# Patient Record
Sex: FEMALE | Race: WHITE | NOT HISPANIC OR LATINO | Employment: UNEMPLOYED | URBAN - NONMETROPOLITAN AREA
[De-identification: names, ages, dates, MRNs, and addresses within clinical notes are randomized per-mention and may not be internally consistent; named-entity substitution may affect disease eponyms.]

---
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
## 2018-07-01 ENCOUNTER — APPOINTMENT (OUTPATIENT)
Dept: GENERAL RADIOLOGY | Facility: HOSPITAL | Age: 18
End: 2018-07-01
Attending: INTERNAL MEDICINE
Payer: COMMERCIAL

## 2018-07-01 ENCOUNTER — HOSPITAL ENCOUNTER (EMERGENCY)
Facility: HOSPITAL | Age: 18
Discharge: HOME | End: 2018-07-01
Attending: INTERNAL MEDICINE | Admitting: INTERNAL MEDICINE
Payer: COMMERCIAL

## 2018-07-01 VITALS
OXYGEN SATURATION: 97 % | RESPIRATION RATE: 16 BRPM | TEMPERATURE: 97.7 F | DIASTOLIC BLOOD PRESSURE: 76 MMHG | SYSTOLIC BLOOD PRESSURE: 114 MMHG

## 2018-07-01 DIAGNOSIS — J06.9 ACUTE URI: ICD-10-CM

## 2018-07-01 DIAGNOSIS — K59.00 CONSTIPATION, UNSPECIFIED CONSTIPATION TYPE: ICD-10-CM

## 2018-07-01 LAB
ALBUMIN UR-MCNC: 30 MG/DL
APPEARANCE UR: ABNORMAL
BACTERIA #/AREA URNS HPF: ABNORMAL /HPF
BILIRUB UR QL STRIP: ABNORMAL
COLOR UR AUTO: YELLOW
GLUCOSE UR STRIP-MCNC: NEGATIVE MG/DL
HCG UR QL: NEGATIVE
HGB UR QL STRIP: ABNORMAL
KETONES UR STRIP-MCNC: 10 MG/DL
LEUKOCYTE ESTERASE UR QL STRIP: ABNORMAL
MUCOUS THREADS #/AREA URNS LPF: PRESENT /LPF
NITRATE UR QL: NEGATIVE
PH UR STRIP: 5.5 PH (ref 4.7–8)
RBC #/AREA URNS AUTO: 65 /HPF (ref 0–2)
SOURCE: ABNORMAL
SP GR UR STRIP: 1.02 (ref 1–1.03)
SQUAMOUS #/AREA URNS AUTO: 2 /HPF (ref 0–1)
UROBILINOGEN UR STRIP-MCNC: 4 MG/DL (ref 0–2)
WBC #/AREA URNS AUTO: 5 /HPF (ref 0–5)

## 2018-07-01 PROCEDURE — 99284 EMERGENCY DEPT VISIT MOD MDM: CPT | Mod: 25

## 2018-07-01 PROCEDURE — 25000125 ZZHC RX 250: Performed by: INTERNAL MEDICINE

## 2018-07-01 PROCEDURE — 81001 URINALYSIS AUTO W/SCOPE: CPT | Performed by: INTERNAL MEDICINE

## 2018-07-01 PROCEDURE — 71046 X-RAY EXAM CHEST 2 VIEWS: CPT | Mod: TC

## 2018-07-01 PROCEDURE — 25000132 ZZH RX MED GY IP 250 OP 250 PS 637: Performed by: INTERNAL MEDICINE

## 2018-07-01 PROCEDURE — 81025 URINE PREGNANCY TEST: CPT | Performed by: INTERNAL MEDICINE

## 2018-07-01 PROCEDURE — 99284 EMERGENCY DEPT VISIT MOD MDM: CPT | Performed by: INTERNAL MEDICINE

## 2018-07-01 RX ORDER — PREDNISONE 20 MG/1
20 TABLET ORAL ONCE
Status: COMPLETED | OUTPATIENT
Start: 2018-07-01 | End: 2018-07-01

## 2018-07-01 RX ORDER — LACTULOSE 10 G/15ML
20 SOLUTION ORAL 2 TIMES DAILY
Qty: 473 ML | Refills: 0 | Status: SHIPPED | OUTPATIENT
Start: 2018-07-01 | End: 2018-08-19

## 2018-07-01 RX ORDER — PREDNISONE 20 MG/1
20 TABLET ORAL DAILY
Qty: 3 TABLET | Refills: 0 | Status: SHIPPED | OUTPATIENT
Start: 2018-07-01 | End: 2018-08-19

## 2018-07-01 RX ORDER — ALUMINA, MAGNESIA, AND SIMETHICONE 2400; 2400; 240 MG/30ML; MG/30ML; MG/30ML
30 SUSPENSION ORAL ONCE
Status: COMPLETED | OUTPATIENT
Start: 2018-07-01 | End: 2018-07-01

## 2018-07-01 RX ORDER — BENZONATATE 100 MG/1
100 CAPSULE ORAL ONCE
Status: COMPLETED | OUTPATIENT
Start: 2018-07-01 | End: 2018-07-01

## 2018-07-01 RX ORDER — GUAIFENESIN 600 MG/1
600 TABLET, EXTENDED RELEASE ORAL 2 TIMES DAILY
COMMUNITY
End: 2018-08-19

## 2018-07-01 RX ORDER — MAGNESIUM CARB/ALUMINUM HYDROX 105-160MG
148 TABLET,CHEWABLE ORAL ONCE
Status: COMPLETED | OUTPATIENT
Start: 2018-07-01 | End: 2018-07-01

## 2018-07-01 RX ADMIN — MAGESIUM CITRATE 148 ML: 1.75 LIQUID ORAL at 04:31

## 2018-07-01 RX ADMIN — PREDNISONE 20 MG: 20 TABLET ORAL at 04:31

## 2018-07-01 RX ADMIN — BENZONATATE 100 MG: 100 CAPSULE, LIQUID FILLED ORAL at 02:32

## 2018-07-01 RX ADMIN — ALUMINUM HYDROXIDE, MAGNESIUM HYDROXIDE, AND DIMETHICONE 30 ML: 400; 400; 40 SUSPENSION ORAL at 02:32

## 2018-07-01 ASSESSMENT — ENCOUNTER SYMPTOMS
DIZZINESS: 0
FEVER: 0
BLOOD IN STOOL: 0
WHEEZING: 0
ABDOMINAL PAIN: 0
CHEST TIGHTNESS: 0
CHILLS: 0
NAUSEA: 0
ARTHRALGIAS: 0
COUGH: 1
VOMITING: 0
NUMBNESS: 0
DYSURIA: 0
BACK PAIN: 0
PALPITATIONS: 0
HEMATURIA: 0
WOUND: 0
MYALGIAS: 0
CONFUSION: 0
VOICE CHANGE: 0
RHINORRHEA: 1
DIAPHORESIS: 0
NECK STIFFNESS: 0
LIGHT-HEADEDNESS: 0
ANAL BLEEDING: 0
FLANK PAIN: 0
ABDOMINAL DISTENTION: 0
COLOR CHANGE: 0
HEADACHES: 0
SHORTNESS OF BREATH: 0
NECK PAIN: 0

## 2018-07-01 NOTE — ED NOTES
D/c instructiosn reviewed with patient  2 rx's given and will fill at pharmacy of choice.  encouraged to return with new or worsening symptoms   No questions or concerns.

## 2018-07-01 NOTE — ED PROVIDER NOTES
History     Chief Complaint   Patient presents with     Cough     Rib Pain     from coughing     Patient is a 17 year old female presenting with cough. The history is provided by the patient.   Cough   Cough characteristics:  Dry  Severity:  Moderate  Onset quality:  Gradual  Timing:  Constant  Chronicity:  New  Associated symptoms: rhinorrhea    Associated symptoms: no chest pain, no chills, no diaphoresis, no fever, no headaches, no myalgias, no rash, no shortness of breath and no wheezing        Problem List:    Patient Active Problem List    Diagnosis Date Noted     Contraceptive management 12/03/2015     Priority: Medium     Overview:   implanon 10/15       Ruptured ovarian cyst 02/10/2014     Priority: Medium     Ruptured ovarian cyst with hemoperitoneum and pelvic pain          Past Medical History:    No past medical history on file.    Past Surgical History:    Past Surgical History:   Procedure Laterality Date     HC REMOVE TONSILS/ADENOIDS,<11 Y/O       SURGICAL PATHOLOGY EXAM      removal of papiloma       Family History:    Family History   Problem Relation Age of Onset     Unknown/Adopted No family hx of      Asthma No family hx of      C.A.D. No family hx of      Diabetes No family hx of      Cerebrovascular Disease No family hx of      Hypertension No family hx of      Hypertension No family hx of      Cerebrovascular Disease No family hx of        Social History:  Marital Status:  Single [1]  Social History   Substance Use Topics     Smoking status: Never Smoker     Smokeless tobacco: Never Used     Alcohol use No      Comment: last use month ago        Medications:      guaiFENesin (MUCINEX) 600 MG 12 hr tablet   lactulose (CHRONULAC) 10 GM/15ML solution   predniSONE (DELTASONE) 20 MG tablet         Review of Systems   Constitutional: Negative for chills, diaphoresis and fever.   HENT: Positive for congestion and rhinorrhea. Negative for voice change.    Eyes: Negative for visual disturbance.    Respiratory: Positive for cough. Negative for chest tightness, shortness of breath and wheezing.    Cardiovascular: Negative for chest pain, palpitations and leg swelling.   Gastrointestinal: Negative for abdominal distention, abdominal pain, anal bleeding, blood in stool, nausea and vomiting.   Genitourinary: Negative for decreased urine volume, dysuria, flank pain and hematuria.   Musculoskeletal: Negative for arthralgias, back pain, gait problem, myalgias, neck pain and neck stiffness.   Skin: Negative for color change, pallor, rash and wound.   Neurological: Negative for dizziness, syncope, light-headedness, numbness and headaches.   Psychiatric/Behavioral: Negative for confusion and suicidal ideas.       Physical Exam   BP: 114/76  Heart Rate: 99  Temp: 97.7  F (36.5  C)  Resp: 16  SpO2: 97 %      Physical Exam   Constitutional: She is oriented to person, place, and time. She appears well-developed and well-nourished.   HENT:   Head: Normocephalic and atraumatic.   Mouth/Throat: No oropharyngeal exudate.   Eyes: Conjunctivae are normal. Pupils are equal, round, and reactive to light.   Neck: Normal range of motion. Neck supple. No JVD present. No tracheal deviation present. No thyromegaly present.   Cardiovascular: Normal rate, regular rhythm, normal heart sounds and intact distal pulses.  Exam reveals no gallop and no friction rub.    No murmur heard.  Pulmonary/Chest: Effort normal and breath sounds normal. No stridor. No respiratory distress. She has no wheezes. She has no rales. She exhibits no tenderness.   Abdominal: Soft. Bowel sounds are normal. She exhibits no distension and no mass. There is no tenderness. There is no rebound and no guarding.   Musculoskeletal: Normal range of motion. She exhibits no edema or tenderness.   Lymphadenopathy:     She has no cervical adenopathy.   Neurological: She is alert and oriented to person, place, and time.   Skin: Skin is warm and dry. No rash noted. No  erythema. No pallor.   Psychiatric: Her behavior is normal.   Nursing note and vitals reviewed.      ED Course     ED Course     Procedures                   Results for orders placed or performed during the hospital encounter of 07/01/18 (from the past 24 hour(s))   UA with Microscopic reflex to Culture   Result Value Ref Range    Color Urine Yellow     Appearance Urine Slightly Cloudy     Glucose Urine Negative NEG^Negative mg/dL    Bilirubin Urine Small (A) NEG^Negative    Ketones Urine 10 (A) NEG^Negative mg/dL    Specific Gravity Urine 1.025 1.003 - 1.035    Blood Urine Moderate (A) NEG^Negative    pH Urine 5.5 4.7 - 8.0 pH    Protein Albumin Urine 30 (A) NEG^Negative mg/dL    Urobilinogen mg/dL 4.0 (H) 0.0 - 2.0 mg/dL    Nitrite Urine Negative NEG^Negative    Leukocyte Esterase Urine Small (A) NEG^Negative    Source Midstream Urine     WBC Urine 5 0 - 5 /HPF    RBC Urine 65 (H) 0 - 2 /HPF    Bacteria Urine None (A) NEG^Negative /HPF    Squamous Epithelial /HPF Urine 2 (H) 0 - 1 /HPF    Mucous Urine Present (A) NEG^Negative /LPF   HCG qualitative urine   Result Value Ref Range    HCG Qual Urine Negative NEG^Negative       Medications   alum & mag hydroxide-simethicone (MYLANTA ES/MAALOX  ES) suspension 30 mL (30 mLs Oral Given 7/1/18 0232)   benzonatate (TESSALON) capsule 100 mg (100 mg Oral Given 7/1/18 0232)   predniSONE (DELTASONE) tablet 20 mg (20 mg Oral Given 7/1/18 0431)   magnesium citrate solution 148 mL (148 mLs Oral Given 7/1/18 0431)       Assessments & Plan (with Medical Decision Making)   Dry cough, URI symptoms, Left lower chest/flank pain with coughing  CXR: negative  Pt obseved in ER, symptoms improved  Symptomatic treatment for URIs  UA: hemturia due to mestral period  Dc home, fu with PCP  I have reviewed the nursing notes.    I have reviewed the findings, diagnosis, plan and need for follow up with the patient.      New Prescriptions    LACTULOSE (CHRONULAC) 10 GM/15ML SOLUTION    Take 30  mLs (20 g) by mouth 2 times daily    PREDNISONE (DELTASONE) 20 MG TABLET    Take 1 tablet (20 mg) by mouth daily       Final diagnoses:   Acute URI   Constipation, unspecified constipation type       7/1/2018   [facility]     Fabricio Miner MD  07/01/18 5499

## 2018-07-01 NOTE — ED AVS SNAPSHOT
[facility]    [address] [address]    Phone:  [phone]                                       Shirley Daniels   MRN: [id number]    Department:  [facility]   Date of Visit:  7/1/2018           Patient Information     Date Of Birth          2000        Your diagnoses for this visit were:     Acute URI     Constipation, unspecified constipation type        You were seen by Fabricio Miner MD.      Follow-up Information     Schedule an appointment as soon as possible for a visit with Steafn Nur MD.    Specialty:  Family Practice    Contact information:    [facility]  [address] [place] [id number]  [phone]          Discharge Instructions         Viral Upper Respiratory Illness (Adult)  You have a viral upper respiratory illness (URI), which is another term for the common cold. This illness is contagious during the first few days. It is spread through the air by coughing and sneezing. It may also be spread by direct contact (touching the sick person and then touching your own eyes, nose, or mouth). Frequent handwashing will decrease risk of spread. Most viral illnesses go away within 7 to 10 days with rest and simple home remedies. Sometimes the illness may last for several weeks. Antibiotics will not kill a virus, and they are generally not prescribed for this condition.    Home care    If symptoms are severe, rest at home for the first 2 to 3 days. When you resume activity, don't let yourself get too tired.    Avoid being exposed to cigarette smoke (yours or others ).    You may use acetaminophen or ibuprofen to control pain and fever, unless another medicine was prescribed. If you have chronic liver or kidney disease, have ever had a stomach ulcer or gastrointestinal bleeding, or are taking blood-thinning medicines, talk with your healthcare provider before using these medicines. Aspirin should never be given to anyone under 18 years of  age who is ill with a viral infection or fever. It may cause severe liver or brain damage.    Your appetite may be poor, so a light diet is fine. Avoid dehydration by drinking 6 to 8 glasses of fluids per day (water, soft drinks, juices, tea, or soup). Extra fluids will help loosen secretions in the nose and lungs.    Over-the-counter cold medicines will not shorten the length of time you re sick, but they may be helpful for the following symptoms: cough, sore throat, and nasal and sinus congestion. (Note: Do not use decongestants if you have high blood pressure.)  Follow-up care  Follow up with your healthcare provider, or as advised.  When to seek medical advice  Call your healthcare provider right away if any of these occur:    Cough with lots of colored sputum (mucus)    Severe headache; face, neck, or ear pain    Difficulty swallowing due to throat pain    Fever of 100.4 F (38 C) or higher, or as directed by your healthcare provider  Call 911  Call 911 if any of these occur:    Chest pain, shortness of breath, wheezing, or difficulty breathing    Coughing up blood    Inability to swallow due to throat pain  Date Last Reviewed: 9/13/2015 2000-2017 [employer]. [address] [place] [address]. All rights reserved. This information is not intended as a substitute for professional medical care. Always follow your healthcare professional's instructions.             Review of your medicines      START taking        Dose / Directions Last dose taken    lactulose 10 GM/15ML solution   Commonly known as:  CHRONULAC   Dose:  20 g   Quantity:  473 mL        Take 30 mLs (20 g) by mouth 2 times daily   Refills:  0        predniSONE 20 MG tablet   Commonly known as:  DELTASONE   Dose:  20 mg   Quantity:  3 tablet        Take 1 tablet (20 mg) by mouth daily   Refills:  0          Our records show that you are taking the medicines listed below. If these are incorrect, please call your family doctor  "or clinic.        Dose / Directions Last dose taken    guaiFENesin 600 MG 12 hr tablet   Commonly known as:  MUCINEX   Dose:  600 mg        Take 600 mg by mouth 2 times daily   Refills:  0                Prescriptions were sent or printed at these locations (2 Prescriptions)                   Other Prescriptions                Printed at Department/Unit printer (2 of 2)         lactulose (CHRONULAC) 10 GM/15ML solution               predniSONE (DELTASONE) 20 MG tablet                Procedures and tests performed during your visit     HCG qualitative urine    UA with Microscopic reflex to Culture    XR Chest 2 Views      Orders Needing Specimen Collection     None      Pending Results     Date and Time Order Name Status Description    2018 0150 XR Chest 2 Views In process             Pending Culture Results     No orders found from 2018 to 2018.            Thank you for choosing [facility]       Thank you for choosing [facility] for your care. Our goal is always to provide you with excellent care. Hearing back from our patients is one way we can continue to improve our services. Please take a few minutes to complete the written survey that you may receive in the mail after you visit with us. Thank you!        [employer] Information     [employer] lets you send messages to your doctor, view your test results, renew your prescriptions, schedule appointments and more. To sign up, go to [email or web address] . Click on \"Log in\" on the left side of the screen, which will take you to the Welcome page. Then click on \"Sign up Now\" on the right side of the page.     You will be asked to enter the access code listed below, as well as some personal information. Please follow the directions to create your username and password.     Your access code is: [email or web address]  Expires: 2018  4:27 AM     Your access code will  in 90 days. If you need help or a new code, please call your [facility] clinic or [phone].      "   Care EveryWhere ID     This is your Care EveryWhere ID. This could be used by other organizations to access your [facility] medical records  [id number]        Equal Access to Services     SANJUANA CLEMENTS : Saira Medel, miladys burns, elle tanner. So [facility] [phone].    ATENCIÓN: Si habla español, tiene a bales disposición servicios gratuitos de asistencia lingüística. Llame al [phone].    We comply with applicable federal civil rights laws and [place] laws. We do not discriminate on the basis of race, color, national origin, age, disability, sex, sexual orientation, or gender identity.            After Visit Summary       This is your record. Keep this with you and show to your community pharmacist(s) and doctor(s) at your next visit.

## 2018-07-01 NOTE — DISCHARGE INSTRUCTIONS
Viral Upper Respiratory Illness (Adult)  You have a viral upper respiratory illness (URI), which is another term for the common cold. This illness is contagious during the first few days. It is spread through the air by coughing and sneezing. It may also be spread by direct contact (touching the sick person and then touching your own eyes, nose, or mouth). Frequent handwashing will decrease risk of spread. Most viral illnesses go away within 7 to 10 days with rest and simple home remedies. Sometimes the illness may last for several weeks. Antibiotics will not kill a virus, and they are generally not prescribed for this condition.    Home care    If symptoms are severe, rest at home for the first 2 to 3 days. When you resume activity, don't let yourself get too tired.    Avoid being exposed to cigarette smoke (yours or others ).    You may use acetaminophen or ibuprofen to control pain and fever, unless another medicine was prescribed. If you have chronic liver or kidney disease, have ever had a stomach ulcer or gastrointestinal bleeding, or are taking blood-thinning medicines, talk with your healthcare provider before using these medicines. Aspirin should never be given to anyone under 18 years of age who is ill with a viral infection or fever. It may cause severe liver or brain damage.    Your appetite may be poor, so a light diet is fine. Avoid dehydration by drinking 6 to 8 glasses of fluids per day (water, soft drinks, juices, tea, or soup). Extra fluids will help loosen secretions in the nose and lungs.    Over-the-counter cold medicines will not shorten the length of time you re sick, but they may be helpful for the following symptoms: cough, sore throat, and nasal and sinus congestion. (Note: Do not use decongestants if you have high blood pressure.)  Follow-up care  Follow up with your healthcare provider, or as advised.  When to seek medical advice  Call your healthcare provider right away if any of these  occur:    Cough with lots of colored sputum (mucus)    Severe headache; face, neck, or ear pain    Difficulty swallowing due to throat pain    Fever of 100.4 F (38 C) or higher, or as directed by your healthcare provider  Call 911  Call 911 if any of these occur:    Chest pain, shortness of breath, wheezing, or difficulty breathing    Coughing up blood    Inability to swallow due to throat pain  Date Last Reviewed: 9/13/2015 2000-2017 [employer]. [address] [place] [address]. All rights reserved. This information is not intended as a substitute for professional medical care. Always follow your healthcare professional's instructions.

## 2018-07-01 NOTE — ED AVS SNAPSHOT
[facility]    [address] [address]    Phone:  [phone]                                       Shirley Daniels   MRN: [id number]    Department:  [facility]   Date of Visit:  7/1/2018           After Visit Summary Signature Page     I have received my discharge instructions, and my questions have been answered. I have discussed any challenges I see with this plan with the nurse or doctor.    ..........................................................................................................................................  Patient/Patient Representative Signature      ..........................................................................................................................................  Patient Representative Print Name and Relationship to Patient    ..................................................               ................................................  Date                                            Time    ..........................................................................................................................................  Reviewed by Signature/Title    ...................................................              ..............................................  Date                                                            Time

## 2018-08-19 ENCOUNTER — HOSPITAL ENCOUNTER (EMERGENCY)
Facility: HOSPITAL | Age: 18
Discharge: HOME | End: 2018-08-19
Attending: PHYSICIAN ASSISTANT | Admitting: PHYSICIAN ASSISTANT
Payer: COMMERCIAL

## 2018-08-19 VITALS
RESPIRATION RATE: 16 BRPM | SYSTOLIC BLOOD PRESSURE: 107 MMHG | BODY MASS INDEX: 21.26 KG/M2 | DIASTOLIC BLOOD PRESSURE: 62 MMHG | WEIGHT: 120 LBS | TEMPERATURE: 100.2 F | OXYGEN SATURATION: 98 % | HEART RATE: 96 BPM | HEIGHT: 63 IN

## 2018-08-19 DIAGNOSIS — R19.7 DIARRHEA, UNSPECIFIED TYPE: ICD-10-CM

## 2018-08-19 DIAGNOSIS — N10 ACUTE PYELONEPHRITIS: ICD-10-CM

## 2018-08-19 LAB
ALBUMIN SERPL-MCNC: 3.2 G/DL (ref 3.4–5)
ALBUMIN UR-MCNC: 100 MG/DL
ALP SERPL-CCNC: 99 U/L (ref 40–150)
ALT SERPL W P-5'-P-CCNC: 23 U/L (ref 0–50)
ANION GAP SERPL CALCULATED.3IONS-SCNC: 7 MMOL/L (ref 3–14)
APPEARANCE UR: ABNORMAL
AST SERPL W P-5'-P-CCNC: 10 U/L (ref 0–35)
BACTERIA #/AREA URNS HPF: ABNORMAL /HPF
BASOPHILS # BLD AUTO: 0 10E9/L (ref 0–0.2)
BASOPHILS NFR BLD AUTO: 0.2 %
BILIRUB SERPL-MCNC: 0.8 MG/DL (ref 0.2–1.3)
BILIRUB UR QL STRIP: NEGATIVE
BUN SERPL-MCNC: 7 MG/DL (ref 7–30)
CALCIUM SERPL-MCNC: 8.5 MG/DL (ref 8.5–10.1)
CHLORIDE SERPL-SCNC: 99 MMOL/L (ref 96–110)
CO2 SERPL-SCNC: 27 MMOL/L (ref 20–32)
COLOR UR AUTO: YELLOW
CREAT SERPL-MCNC: 0.89 MG/DL (ref 0.5–1)
DIFFERENTIAL METHOD BLD: ABNORMAL
EOSINOPHIL # BLD AUTO: 0 10E9/L (ref 0–0.7)
EOSINOPHIL NFR BLD AUTO: 0.1 %
ERYTHROCYTE [DISTWIDTH] IN BLOOD BY AUTOMATED COUNT: 13.1 % (ref 10–15)
GFR SERPL CREATININE-BSD FRML MDRD: 81 ML/MIN/1.7M2
GLUCOSE SERPL-MCNC: 122 MG/DL (ref 70–99)
GLUCOSE UR STRIP-MCNC: NEGATIVE MG/DL
HCG UR QL: NEGATIVE
HCT VFR BLD AUTO: 38.7 % (ref 35–47)
HGB BLD-MCNC: 13.5 G/DL (ref 11.7–15.7)
HGB UR QL STRIP: ABNORMAL
IMM GRANULOCYTES # BLD: 0.1 10E9/L (ref 0–0.4)
IMM GRANULOCYTES NFR BLD: 0.7 %
KETONES UR STRIP-MCNC: NEGATIVE MG/DL
LACTATE SERPL-SCNC: 1.8 MMOL/L (ref 0.4–2)
LEUKOCYTE ESTERASE UR QL STRIP: ABNORMAL
LYMPHOCYTES # BLD AUTO: 1.6 10E9/L (ref 0.8–5.3)
LYMPHOCYTES NFR BLD AUTO: 9.9 %
MCH RBC QN AUTO: 29.4 PG (ref 26.5–33)
MCHC RBC AUTO-ENTMCNC: 34.9 G/DL (ref 31.5–36.5)
MCV RBC AUTO: 84 FL (ref 78–100)
MONOCYTES # BLD AUTO: 1.6 10E9/L (ref 0–1.3)
MONOCYTES NFR BLD AUTO: 9.8 %
MUCOUS THREADS #/AREA URNS LPF: PRESENT /LPF
NEUTROPHILS # BLD AUTO: 12.7 10E9/L (ref 1.6–8.3)
NEUTROPHILS NFR BLD AUTO: 79.3 %
NITRATE UR QL: NEGATIVE
NRBC # BLD AUTO: 0 10*3/UL
NRBC BLD AUTO-RTO: 0 /100
PH UR STRIP: 6.5 PH (ref 4.7–8)
PLATELET # BLD AUTO: 277 10E9/L (ref 150–450)
POTASSIUM SERPL-SCNC: 3.7 MMOL/L (ref 3.4–5.3)
PROT SERPL-MCNC: 7.3 G/DL (ref 6.8–8.8)
RBC # BLD AUTO: 4.59 10E12/L (ref 3.8–5.2)
RBC #/AREA URNS AUTO: 7 /HPF (ref 0–2)
SODIUM SERPL-SCNC: 133 MMOL/L (ref 133–144)
SOURCE: ABNORMAL
SP GR UR STRIP: 1.01 (ref 1–1.03)
SQUAMOUS #/AREA URNS AUTO: 14 /HPF (ref 0–1)
UROBILINOGEN UR STRIP-MCNC: NORMAL MG/DL (ref 0–2)
WBC # BLD AUTO: 16 10E9/L (ref 4–11)
WBC #/AREA URNS AUTO: 146 /HPF (ref 0–5)

## 2018-08-19 PROCEDURE — 80053 COMPREHEN METABOLIC PANEL: CPT | Performed by: FAMILY MEDICINE

## 2018-08-19 PROCEDURE — 83605 ASSAY OF LACTIC ACID: CPT | Performed by: FAMILY MEDICINE

## 2018-08-19 PROCEDURE — 87088 URINE BACTERIA CULTURE: CPT | Performed by: PHYSICIAN ASSISTANT

## 2018-08-19 PROCEDURE — 99284 EMERGENCY DEPT VISIT MOD MDM: CPT | Mod: 25

## 2018-08-19 PROCEDURE — 85025 COMPLETE CBC W/AUTO DIFF WBC: CPT | Performed by: FAMILY MEDICINE

## 2018-08-19 PROCEDURE — 25000128 H RX IP 250 OP 636: Performed by: PHYSICIAN ASSISTANT

## 2018-08-19 PROCEDURE — 96365 THER/PROPH/DIAG IV INF INIT: CPT

## 2018-08-19 PROCEDURE — 87086 URINE CULTURE/COLONY COUNT: CPT | Performed by: PHYSICIAN ASSISTANT

## 2018-08-19 PROCEDURE — 81001 URINALYSIS AUTO W/SCOPE: CPT | Performed by: FAMILY MEDICINE

## 2018-08-19 PROCEDURE — 36415 COLL VENOUS BLD VENIPUNCTURE: CPT | Performed by: FAMILY MEDICINE

## 2018-08-19 PROCEDURE — 87186 SC STD MICRODIL/AGAR DIL: CPT | Performed by: PHYSICIAN ASSISTANT

## 2018-08-19 PROCEDURE — 87040 BLOOD CULTURE FOR BACTERIA: CPT | Performed by: PHYSICIAN ASSISTANT

## 2018-08-19 PROCEDURE — 81025 URINE PREGNANCY TEST: CPT | Performed by: FAMILY MEDICINE

## 2018-08-19 PROCEDURE — 25000128 H RX IP 250 OP 636: Performed by: FAMILY MEDICINE

## 2018-08-19 PROCEDURE — 99284 EMERGENCY DEPT VISIT MOD MDM: CPT | Performed by: PHYSICIAN ASSISTANT

## 2018-08-19 PROCEDURE — 96361 HYDRATE IV INFUSION ADD-ON: CPT

## 2018-08-19 RX ORDER — CIPROFLOXACIN 500 MG/1
500 TABLET, FILM COATED ORAL 2 TIMES DAILY
Qty: 14 TABLET | Refills: 0 | Status: SHIPPED | OUTPATIENT
Start: 2018-08-19 | End: 2018-08-26

## 2018-08-19 RX ORDER — CEFTRIAXONE SODIUM 2 G/50ML
2 INJECTION, SOLUTION INTRAVENOUS ONCE
Status: COMPLETED | OUTPATIENT
Start: 2018-08-19 | End: 2018-08-19

## 2018-08-19 RX ADMIN — SODIUM CHLORIDE 1000 ML: 9 INJECTION, SOLUTION INTRAVENOUS at 11:27

## 2018-08-19 RX ADMIN — CEFTRIAXONE SODIUM 2 G: 2 INJECTION, SOLUTION INTRAVENOUS at 12:19

## 2018-08-19 ASSESSMENT — ENCOUNTER SYMPTOMS
ACTIVITY CHANGE: 1
DYSURIA: 1
FATIGUE: 1
FREQUENCY: 1
FEVER: 1
WEAKNESS: 1
NAUSEA: 1
CHILLS: 1
HEMATURIA: 0
HEADACHES: 0
CHEST TIGHTNESS: 0
SHORTNESS OF BREATH: 0
ABDOMINAL PAIN: 1
COUGH: 0
VOMITING: 0
APPETITE CHANGE: 1
FLANK PAIN: 1
LIGHT-HEADEDNESS: 1

## 2018-08-19 NOTE — DISCHARGE INSTRUCTIONS
Rest and stay hydrated.     Stop the Lactulose.     Ciprofloxacin twice a day for 7 days.    Follow-up with Dr. Nur next week.     Return here for any worsening symptoms or other concerns whatsoever.

## 2018-08-19 NOTE — ED AVS SNAPSHOT
[facility]    [address] [address]    Phone:  [phone]                                       Shirley Daniels   MRN: [id number]    Department:  [facility]   Date of Visit:  8/19/2018           Patient Information     Date Of Birth          2000        Your diagnoses for this visit were:     Acute pyelonephritis     Diarrhea, unspecified type        You were seen by Guanaco Machado PA-C.      Follow-up Information     Schedule an appointment as soon as possible for a visit with Stefan Nur MD.    Specialty:  Family Practice    Contact information:    [facility]  [address] [place] [address] [phone]          Follow up with [facility].    Specialty:  EMERGENCY MEDICINE    Why:  If symptoms worsen    Contact information:    [address] [address] [phone]    Additional information:    From [place]: Take [address]. Turn left at [address]. Turn left at the first stoplight on [address]. At the first stop sign, take a right onto [address]. Take a left into the parking lot and continue through until you reach the North enterance of the building.       From [place]: Take [address]. Take the [address] ramp towards [place]. Turn left onto [address]. Take a slight right onto [address]. Turn left at the first stoplight on [address]. At the first stop sign, take a right onto [address]. Take a left into the parking lot and continue through until you reach the North enterance of the building.       From [place]: Take [address]. Take a right at [address]. At the first stop sign, take a right onto [address]. Take a left into the parking lot and continue through until you reach the North enterance of the building.         Discharge Instructions       Rest and stay hydrated.     Stop the Lactulose.      Ciprofloxacin twice a day for 7 days.    Follow-up with Dr. Nur next week.     Return here for any worsening symptoms or other concerns whatsoever.     Discharge References/Attachments     PYELONEPHRITIS, FEMALE (ADULT) (ENGLISH)    DIARRHEA, UNKNOWN CAUSE (ENGLISH)         Review of your medicines      START taking        Dose / Directions Last dose taken    ciprofloxacin 500 MG tablet   Commonly known as:  CIPRO   Dose:  500 mg   Quantity:  14 tablet        Take 1 tablet (500 mg) by mouth 2 times daily for 7 days   Refills:  0          STOP taking        Dose Reason for stopping Comments    lactulose 10 GM/15ML solution   Commonly known as:  CHRONULAC                      Prescriptions were sent or printed at these locations (1 Prescription)                   [facility] - [place], [place] - [address]    [address] , [place] [address]    Telephone:  [phone]   Fax:  [phone]   Hours:                  E-Prescribed (1 of 1)         ciprofloxacin (CIPRO) 500 MG tablet                Procedures and tests performed during your visit     Procedure/Test Number of Times Performed    Blood culture 2    CBC with platelets differential 1    Cardiac Continuous Monitoring 1    Comprehensive metabolic panel 1    Draw and hold 3    HCG qualitative urine (UPT) 1    Lactic acid 1    Peripheral IV: Standard 1    Pulse oximetry nursing 1    UA reflex to Microscopic and Culture 1    Urine Culture 1      Orders Needing Specimen Collection     None      Pending Results     Date and Time Order Name Status Description    8/19/2018 1145 Urine Culture In process     8/19/2018 1131 Blood culture In process     8/19/2018 1131 Blood culture In process             Pending Culture Results     Date and Time Order Name Status Description    8/19/2018 1145 Urine Culture In process     8/19/2018 1131 Blood culture In process     8/19/2018 1131 Blood culture In process             Thank you for choosing [facility]       Thank  "you for choosing [facility] for your care. Our goal is always to provide you with excellent care. Hearing back from our patients is one way we can continue to improve our services. Please take a few minutes to complete the written survey that you may receive in the mail after you visit with us. Thank you!        [employer] Information     [employer] lets you send messages to your doctor, view your test results, renew your prescriptions, schedule appointments and more. To sign up, go to [email or web address] . Click on \"Log in\" on the left side of the screen, which will take you to the Welcome page. Then click on \"Sign up Now\" on the right side of the page.     You will be asked to enter the access code listed below, as well as some personal information. Please follow the directions to create your username and password.     Your access code is: [email or web address]  Expires: 2018  4:27 AM     Your access code will  in 90 days. If you need help or a new code, please call your [facility] clinic or [phone].        Care EveryWhere ID     This is your Care EveryWhere ID. This could be used by other organizations to access your [facility] medical records  [id number]        Equal Access to Services     SANJUANA CLEMENTS : Saira Medel, walucianada katy, qaybta kaalmada adetellyyacarter, elle estrada. So [facility] [phone].    ATENCIÓN: Si habla español, tiene a bales disposición servicios gratuitos de asistencia lingüística. Llame al [phone].    We comply with applicable federal civil rights laws and [place] laws. We do not discriminate on the basis of race, color, national origin, age, disability, sex, sexual orientation, or gender identity.            After Visit Summary       This is your record. Keep this with you and show to your community pharmacist(s) and doctor(s) at your next visit.                  "

## 2018-08-19 NOTE — ED NOTES
"Pt ambulatory to ED room 4 with s/o. Pt has c/o n/v/d and right sided abd pain into her flank x4 days. Pt does have appendix but does not have gallbladder. Pt states that she is currently has her menstrual period but states that it is \"very light.\" Pt denies dysuria and vaginal discharge. Tenderness on palpation. Pt has not taken anything for fever/pain and denies recent abx usage.   "

## 2018-08-19 NOTE — ED PROVIDER NOTES
History     Chief Complaint   Patient presents with     Nausea, Vomiting, & Diarrhea     for 4-5 days.     Abdominal Pain     right flank radiating to right abdomen.     The history is provided by the patient.     Shirley Daniels is a 17 year old female who presented to the emergency department ambulatory along with significant other for evaluation of right flank pain and fevers.  Shirley tells me that she began to experience lower urinary tract symptoms of urinary hesitancy, frequency, and dysuria approximately 2 weeks ago.  48 hours ago she began to experience chills and a fever.  Yesterday she began to experience right flank pain with radiation to the right abdomen.  She has some mild nausea without vomiting.  Also has an appreciable amount of diarrhea but she also takes lactulose for some reason.  Denies any respiratory symptoms.  Denies any vaginal bleeding or discharge.  Denies any pelvic discomfort.  Denies any melena, hematemesis, or hematochezia.    Problem List:    Patient Active Problem List    Diagnosis Date Noted     Contraceptive management 12/03/2015     Priority: Medium     Overview:   implanon 10/15       Ruptured ovarian cyst 02/10/2014     Priority: Medium     Ruptured ovarian cyst with hemoperitoneum and pelvic pain          Past Medical History:    No past medical history on file.    Past Surgical History:    Past Surgical History:   Procedure Laterality Date     HC REMOVE TONSILS/ADENOIDS,<11 Y/O       SURGICAL PATHOLOGY EXAM      removal of papiloma       Family History:    Family History   Problem Relation Age of Onset     Unknown/Adopted No family hx of      Asthma No family hx of      C.A.D. No family hx of      Diabetes No family hx of      Cerebrovascular Disease No family hx of      Hypertension No family hx of      Hypertension No family hx of      Cerebrovascular Disease No family hx of        Social History:  Marital Status:  Single [1]  Social History   Substance Use Topics      "Smoking status: Never Smoker     Smokeless tobacco: Never Used     Alcohol use No      Comment: last use month ago        Medications:      ciprofloxacin (CIPRO) 500 MG tablet         Review of Systems   Constitutional: Positive for activity change, appetite change, chills, fatigue and fever.   HENT: Negative.    Respiratory: Negative for cough, chest tightness and shortness of breath.    Gastrointestinal: Positive for abdominal pain and nausea. Negative for vomiting.   Genitourinary: Positive for dysuria, flank pain, frequency and urgency. Negative for hematuria, menstrual problem, pelvic pain, vaginal bleeding and vaginal discharge.   Skin: Negative.    Neurological: Positive for weakness and light-headedness. Negative for headaches.       Physical Exam   BP: 117/74  Heart Rate: (!) 124  Temp: (!) 102.2  F (39  C)  Resp: 18  Height: 160 cm (5' 3\")  Weight: 49.9 kg (110 lb)  SpO2: 98 %      Physical Exam   Constitutional: She is oriented to person, place, and time. She appears well-developed and well-nourished.   Cardiovascular: Regular rhythm.    Tachycardia   Pulmonary/Chest: Effort normal and breath sounds normal.   Abdominal: Soft. She exhibits no distension. There is no tenderness. There is no guarding.   Examination of the abdomen reveals no appreciable distention, tenderness, guarding, rebound.   Genitourinary:   Genitourinary Comments: Right CVA tenderness.   Neurological: She is alert and oriented to person, place, and time.   Skin: Skin is warm and dry.   Psychiatric: She has a normal mood and affect.   Nursing note and vitals reviewed.      ED Course     ED Course     Procedures               Critical Care time:  none               Results for orders placed or performed during the hospital encounter of 08/19/18 (from the past 24 hour(s))   Comprehensive metabolic panel   Result Value Ref Range    Sodium 133 133 - 144 mmol/L    Potassium 3.7 3.4 - 5.3 mmol/L    Chloride 99 96 - 110 mmol/L    Carbon " Dioxide 27 20 - 32 mmol/L    Anion Gap 7 3 - 14 mmol/L    Glucose 122 (H) 70 - 99 mg/dL    Urea Nitrogen 7 7 - 30 mg/dL    Creatinine 0.89 0.50 - 1.00 mg/dL    GFR Estimate 81 >60 mL/min/1.7m2    GFR Estimate If Black >90 >60 mL/min/1.7m2    Calcium 8.5 8.5 - 10.1 mg/dL    Bilirubin Total 0.8 0.2 - 1.3 mg/dL    Albumin 3.2 (L) 3.4 - 5.0 g/dL    Protein Total 7.3 6.8 - 8.8 g/dL    Alkaline Phosphatase 99 40 - 150 U/L    ALT 23 0 - 50 U/L    AST 10 0 - 35 U/L   CBC with platelets differential   Result Value Ref Range    WBC 16.0 (H) 4.0 - 11.0 10e9/L    RBC Count 4.59 3.8 - 5.2 10e12/L    Hemoglobin 13.5 11.7 - 15.7 g/dL    Hematocrit 38.7 35.0 - 47.0 %    MCV 84 78 - 100 fl    MCH 29.4 26.5 - 33.0 pg    MCHC 34.9 31.5 - 36.5 g/dL    RDW 13.1 10.0 - 15.0 %    Platelet Count 277 150 - 450 10e9/L    Diff Method Automated Method     % Neutrophils 79.3 %    % Lymphocytes 9.9 %    % Monocytes 9.8 %    % Eosinophils 0.1 %    % Basophils 0.2 %    % Immature Granulocytes 0.7 %    Nucleated RBCs 0 0 /100    Absolute Neutrophil 12.7 (H) 1.6 - 8.3 10e9/L    Absolute Lymphocytes 1.6 0.8 - 5.3 10e9/L    Absolute Monocytes 1.6 (H) 0.0 - 1.3 10e9/L    Absolute Eosinophils 0.0 0.0 - 0.7 10e9/L    Absolute Basophils 0.0 0.0 - 0.2 10e9/L    Abs Immature Granulocytes 0.1 0 - 0.4 10e9/L    Absolute Nucleated RBC 0.0    Lactic acid   Result Value Ref Range    Lactic Acid 1.8 0.4 - 2.0 mmol/L   UA reflex to Microscopic and Culture   Result Value Ref Range    Color Urine Yellow     Appearance Urine Slightly Cloudy     Glucose Urine Negative NEG^Negative mg/dL    Bilirubin Urine Negative NEG^Negative    Ketones Urine Negative NEG^Negative mg/dL    Specific Gravity Urine 1.011 1.003 - 1.035    Blood Urine Moderate (A) NEG^Negative    pH Urine 6.5 4.7 - 8.0 pH    Protein Albumin Urine 100 (A) NEG^Negative mg/dL    Urobilinogen mg/dL Normal 0.0 - 2.0 mg/dL    Nitrite Urine Negative NEG^Negative    Leukocyte Esterase Urine Large (A) NEG^Negative     Source Midstream Urine     RBC Urine 7 (H) 0 - 2 /HPF    WBC Urine 146 (H) 0 - 5 /HPF    Bacteria Urine None (A) NEG^Negative /HPF    Squamous Epithelial /HPF Urine 14 (H) 0 - 1 /HPF    Mucous Urine Present (A) NEG^Negative /LPF   HCG qualitative urine (UPT)   Result Value Ref Range    HCG Qual Urine Negative NEG^Negative       Medications   0.9% sodium chloride BOLUS (0 mLs Intravenous Stopped 8/19/18 1218)   cefTRIAXone IN D5W (ROCEPHIN) intermittent infusion 2 g (0 g Intravenous Stopped 8/19/18 1245)       Assessments & Plan (with Medical Decision Making)   Observed over a protracted period and did great. Workup as above.  History of present illness, exam, and workup is most consistent with right-sided acute pyelonephritis.  Urine will be cultured.  Blood cultures in progress.  Lactic acid is normal.  Mild leukocytosis.  I can find no reasonable or compelling medical indication for imaging at this time.  Exam is relatively benign.  She has no abdominal discomfort on deep palpation.  She does have positive CVA tenderness on the right.  No respiratory symptoms.  2 week hx of LUTS. Treated with IV fluids, IV Rocephin, and home on oral Cipro.  Follow-up in the clinic this week for recheck.  Return to the emergency department for any worsening symptoms or other concerns whatsoever.    (Please note that this note was completed with a voice recognition program.  Every attempt was made to edit the dictations, but inevitably there remain words that are mis-transcribed.)    I have reviewed the nursing notes.    I have reviewed the findings, diagnosis, plan and need for follow up with the patient.       New Prescriptions    CIPROFLOXACIN (CIPRO) 500 MG TABLET    Take 1 tablet (500 mg) by mouth 2 times daily for 7 days       Final diagnoses:   Acute pyelonephritis   Diarrhea, unspecified type       8/19/2018   [facility]     Guanaco Machado PA-C  08/19/18 1250       Guanaco Machado PA-C  08/19/18 1315

## 2018-08-19 NOTE — ED NOTES
Discharge instructions reviewed with patient, and Rx sent to [facility]. Pt verbalized understanding and ambulated with a steady gait to the exit.

## 2018-08-19 NOTE — ED AVS SNAPSHOT
[facility]    [address] [address]    Phone:  [phone]                                       Shirley Daniels   MRN: [id number]    Department:  [facility]   Date of Visit:  8/19/2018           After Visit Summary Signature Page     I have received my discharge instructions, and my questions have been answered. I have discussed any challenges I see with this plan with the nurse or doctor.    ..........................................................................................................................................  Patient/Patient Representative Signature      ..........................................................................................................................................  Patient Representative Print Name and Relationship to Patient    ..................................................               ................................................  Date                                            Time    ..........................................................................................................................................  Reviewed by Signature/Title    ...................................................              ..............................................  Date                                                            Time

## 2018-08-21 LAB
BACTERIA SPEC CULT: ABNORMAL
SPECIMEN SOURCE: ABNORMAL

## 2018-08-25 LAB
BACTERIA SPEC CULT: NORMAL
BACTERIA SPEC CULT: NORMAL
SPECIMEN SOURCE: NORMAL
SPECIMEN SOURCE: NORMAL

## 2018-09-17 ENCOUNTER — APPOINTMENT (OUTPATIENT)
Dept: GENERAL RADIOLOGY | Facility: HOSPITAL | Age: 18
End: 2018-09-17
Attending: INTERNAL MEDICINE
Payer: COMMERCIAL

## 2018-09-17 ENCOUNTER — HOSPITAL ENCOUNTER (EMERGENCY)
Facility: HOSPITAL | Age: 18
Discharge: HOME | End: 2018-09-17
Attending: INTERNAL MEDICINE | Admitting: INTERNAL MEDICINE
Payer: COMMERCIAL

## 2018-09-17 VITALS
DIASTOLIC BLOOD PRESSURE: 81 MMHG | OXYGEN SATURATION: 98 % | BODY MASS INDEX: 21.26 KG/M2 | WEIGHT: 120 LBS | TEMPERATURE: 98.5 F | HEART RATE: 86 BPM | SYSTOLIC BLOOD PRESSURE: 116 MMHG | RESPIRATION RATE: 16 BRPM

## 2018-09-17 DIAGNOSIS — S20.212A CONTUSION OF LEFT CHEST WALL, INITIAL ENCOUNTER: ICD-10-CM
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
## 2018-09-17 PROCEDURE — 10120 INC&RMVL FB SUBQ TISS SMPL: CPT

## 2018-09-17 PROCEDURE — 99283 EMERGENCY DEPT VISIT LOW MDM: CPT | Mod: 25

## 2018-09-17 PROCEDURE — 99202 OFFICE O/P NEW SF 15 MIN: CPT | Mod: 25 | Performed by: SURGERY

## 2018-09-17 PROCEDURE — 10120 INC&RMVL FB SUBQ TISS SMPL: CPT | Performed by: SURGERY

## 2018-09-17 PROCEDURE — 71046 X-RAY EXAM CHEST 2 VIEWS: CPT | Mod: TC

## 2018-09-17 PROCEDURE — 99283 EMERGENCY DEPT VISIT LOW MDM: CPT | Performed by: INTERNAL MEDICINE

## 2018-09-17 ASSESSMENT — ENCOUNTER SYMPTOMS
COUGH: 0
ABDOMINAL DISTENTION: 0
ARTHRALGIAS: 0
DIAPHORESIS: 0
ABDOMINAL PAIN: 0
FEVER: 0
HEMATURIA: 0
COLOR CHANGE: 0
CHILLS: 0
ANAL BLEEDING: 0
PALPITATIONS: 0
NUMBNESS: 0
CONFUSION: 0
WOUND: 0
MYALGIAS: 0
NECK STIFFNESS: 0
DYSURIA: 0
FLANK PAIN: 0
NECK PAIN: 0
DIZZINESS: 0
WHEEZING: 0
VOICE CHANGE: 0
CHEST TIGHTNESS: 0
BLOOD IN STOOL: 0
VOMITING: 0
SHORTNESS OF BREATH: 0
HEADACHES: 0
NAUSEA: 0
LIGHT-HEADEDNESS: 0
BACK PAIN: 0
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
## 2018-09-17 NOTE — ED AVS SNAPSHOT
[facility]    [address] [address]    Phone:  [phone]                                       Shirley Daniels   MRN: [id number]    Department:  [facility]   Date of Visit:  9/17/2018           After Visit Summary Signature Page     I have received my discharge instructions, and my questions have been answered. I have discussed any challenges I see with this plan with the nurse or doctor.    ..........................................................................................................................................  Patient/Patient Representative Signature      ..........................................................................................................................................  Patient Representative Print Name and Relationship to Patient    ..................................................               ................................................  Date                                   Time    ..........................................................................................................................................  Reviewed by Signature/Title    ...................................................              ..............................................  Date                                               Time          22EPIC Rev 08/18
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
## 2019-01-06 ENCOUNTER — HOSPITAL ENCOUNTER (EMERGENCY)
Facility: HOSPITAL | Age: 19
Discharge: HOME | End: 2019-01-06
Attending: NURSE PRACTITIONER | Admitting: NURSE PRACTITIONER
Payer: COMMERCIAL

## 2019-01-06 VITALS
OXYGEN SATURATION: 98 % | DIASTOLIC BLOOD PRESSURE: 75 MMHG | SYSTOLIC BLOOD PRESSURE: 128 MMHG | RESPIRATION RATE: 16 BRPM | TEMPERATURE: 98.6 F

## 2019-01-06 DIAGNOSIS — J00 COMMON COLD: ICD-10-CM

## 2019-01-06 DIAGNOSIS — J98.01 BRONCHOSPASM: ICD-10-CM

## 2019-01-06 PROCEDURE — G0463 HOSPITAL OUTPT CLINIC VISIT: HCPCS

## 2019-01-06 PROCEDURE — 40000275 ZZH STATISTIC RCP TIME EA 10 MIN

## 2019-01-06 PROCEDURE — 99213 OFFICE O/P EST LOW 20 MIN: CPT | Mod: Z6 | Performed by: NURSE PRACTITIONER

## 2019-01-06 PROCEDURE — 94664 DEMO&/EVAL PT USE INHALER: CPT

## 2019-01-06 PROCEDURE — G0463 HOSPITAL OUTPT CLINIC VISIT: HCPCS | Mod: 25

## 2019-01-06 ASSESSMENT — ENCOUNTER SYMPTOMS
MYALGIAS: 0
SORE THROAT: 0
CHEST TIGHTNESS: 1
ARTHRALGIAS: 0
HEADACHES: 0
SINUS PRESSURE: 0
FEVER: 0
FATIGUE: 0
COUGH: 1
NAUSEA: 0
CHILLS: 0
SINUS PAIN: 0
DIARRHEA: 0
APPETITE CHANGE: 0
VOMITING: 0
WHEEZING: 1
ABDOMINAL PAIN: 0

## 2019-01-06 NOTE — ED AVS SNAPSHOT
[facility]  [address] [address]  Phone:  [phone]                                    Shirley Daniels   MRN: [id number]    Department:  [facility]   Date of Visit:  1/6/2019           After Visit Summary Signature Page    I have received my discharge instructions, and my questions have been answered. I have discussed any challenges I see with this plan with the nurse or doctor.    ..........................................................................................................................................  Patient/Patient Representative Signature      ..........................................................................................................................................  Patient Representative Print Name and Relationship to Patient    ..................................................               ................................................  Date                                   Time    ..........................................................................................................................................  Reviewed by Signature/Title    ...................................................              ..............................................  Date                                               Time          22EPIC Rev 08/18

## 2019-01-06 NOTE — PROGRESS NOTES
MDI instruct done with spacer. Pt demonstrated understanding of instructions and was sent with spacer and paper instructions.

## 2019-01-06 NOTE — ED PROVIDER NOTES
"  History     Chief Complaint   Patient presents with     Cough     c/o cough, notes hurts to cough and breathe     HPI  Shirley Daniels is a 18 year old female who presents today with a CC of cough x 2 days.  No fevers or chills.  Appetite has been good.  She has not taken any OTC medications for the symptoms.  She denies history of asthma/reactive airway.  No exposures to ill contacts.      Problem List:    Patient Active Problem List    Diagnosis Date Noted     Contraceptive management 12/03/2015     Priority: Medium     Overview:   implanon 10/15       Ruptured ovarian cyst 02/10/2014     Priority: Medium     Ruptured ovarian cyst with hemoperitoneum and pelvic pain          Past Medical History:    No past medical history on file.    Past Surgical History:    Past Surgical History:   Procedure Laterality Date     HC REMOVE TONSILS/ADENOIDS,<13 Y/O       SURGICAL PATHOLOGY EXAM      removal of papiloma       Family History:    Family History   Problem Relation Age of Onset     Unknown/Adopted No family hx of      Asthma No family hx of      C.A.D. No family hx of      Diabetes No family hx of      Cerebrovascular Disease No family hx of      Hypertension No family hx of      Hypertension No family hx of      Cerebrovascular Disease No family hx of        Social History:  Marital Status:  Single [1]  Social History     Tobacco Use     Smoking status: Never Smoker     Smokeless tobacco: Never Used   Substance Use Topics     Alcohol use: No     Comment: last use month ago     Drug use: Yes     Comment: marijuana use today, uses \"not that often\"        Medications:      No current outpatient medications on file.      Review of Systems   Constitutional: Negative for appetite change, chills, fatigue and fever.   HENT: Negative for ear pain, sinus pressure, sinus pain and sore throat.    Respiratory: Positive for cough, chest tightness and wheezing.    Gastrointestinal: Negative for abdominal pain, diarrhea, nausea " and vomiting.   Musculoskeletal: Negative for arthralgias and myalgias.   Neurological: Negative for headaches.       Physical Exam   BP: 128/75  Heart Rate: 87  Temp: 98.6  F (37  C)  Resp: 16  SpO2: 98 %      Physical Exam   Constitutional: Vital signs are normal. She appears well-developed. She is cooperative. She does not appear ill.   HENT:   Head: Normocephalic and atraumatic.   Right Ear: Tympanic membrane, external ear and ear canal normal.   Left Ear: Tympanic membrane, external ear and ear canal normal.   Mouth/Throat: Uvula is midline and oropharynx is clear and moist.   Neck: Normal range of motion. Neck supple.   Cardiovascular: Normal rate and regular rhythm.   Pulmonary/Chest: Effort normal and breath sounds normal.   Lymphadenopathy:     She has no cervical adenopathy.   Neurological: She is alert.   Skin: Skin is warm and dry.   Nursing note and vitals reviewed.      ED Course        Procedures    Assessments & Plan (with Medical Decision Making)     I have reviewed the nursing notes.    I have reviewed the findings, diagnosis, plan and need for follow up with the patient.  ASSESSMENT / PLAN:  (J98.01) Bronchospasm  Comment: per patient report  Plan:  Albuterol as prescribed   RT instruct spacer provided    (J00) Common cold  Comment: x 2 days, afebrile, NAD  Plan:  Symptoms are likely due to virus. No antibiotic is indicated at this time.     Symptomatic treatments such as those listed below are recommended as indicated:  Take OTC motrin or tylenol as directed on packaging - as needed  Humidified air  May try safely elevating HOB or sleeping in recliner  May try OTC cold medicine as directed on bottle - check ingredients, many are multi symptom and may contain tylenol or motrin  Stay well hydrated, rest, wash hands often  Sinus saline nasal spray with suctioning or rinses such as a netti pot may help with sinus symptoms  Return to ED/UC if symptoms worsen or concerns develop  Patient verbally  educated and given written discharge instructions         Medication List      There are no discharge medications for this visit.         Final diagnoses:   Bronchospasm   Common cold       1/6/2019   [facility]     Larisa Peña NP  01/06/19 5181

## 2019-01-07 RX ORDER — ALBUTEROL SULFATE 90 UG/1
2 AEROSOL, METERED RESPIRATORY (INHALATION) EVERY 4 HOURS PRN
Qty: 1 INHALER | Refills: 0 | Status: ON HOLD | OUTPATIENT
Start: 2019-01-07 | End: 2020-05-09

## 2019-01-07 RX ORDER — ALBUTEROL SULFATE 90 UG/1
2 AEROSOL, METERED RESPIRATORY (INHALATION) EVERY 4 HOURS PRN
Status: DISCONTINUED | OUTPATIENT
Start: 2019-01-07 | End: 2019-01-07

## 2019-01-09 ENCOUNTER — HOSPITAL ENCOUNTER (OUTPATIENT)
Dept: ULTRASOUND IMAGING | Facility: HOSPITAL | Age: 19
Discharge: HOME | End: 2019-01-09
Attending: FAMILY MEDICINE | Admitting: FAMILY MEDICINE
Payer: COMMERCIAL

## 2019-01-09 DIAGNOSIS — Z34.02 ENCOUNTER FOR SUPERVISION OF NORMAL FIRST PREGNANCY IN SECOND TRIMESTER: ICD-10-CM

## 2019-01-09 PROCEDURE — 76805 OB US >/= 14 WKS SNGL FETUS: CPT | Mod: TC

## 2019-03-19 ENCOUNTER — TRANSFERRED RECORDS (OUTPATIENT)
Dept: HEALTH INFORMATION MANAGEMENT | Facility: CLINIC | Age: 19
End: 2019-03-19

## 2019-04-12 ENCOUNTER — TELEPHONE (OUTPATIENT)
Dept: EDUCATION SERVICES | Facility: HOSPITAL | Age: 19
End: 2019-04-12

## 2019-04-12 NOTE — TELEPHONE ENCOUNTER
Received referral for this patient for gestational diabetes she is 33 wks pregnant. I don't see that you have a opening soon. Could you please advise    Thank you  Sari

## 2019-04-16 DIAGNOSIS — O24.419 GESTATIONAL DIABETES MELLITUS (GDM) IN THIRD TRIMESTER, GESTATIONAL DIABETES METHOD OF CONTROL UNSPECIFIED: ICD-10-CM

## 2019-04-16 PROBLEM — F41.1 GAD (GENERALIZED ANXIETY DISORDER): Status: ACTIVE | Noted: 2018-01-30

## 2019-04-16 PROBLEM — F15.11 HISTORY OF METHAMPHETAMINE ABUSE (H): Status: ACTIVE | Noted: 2018-11-16

## 2019-04-16 PROBLEM — F32.1 MODERATE SINGLE CURRENT EPISODE OF MAJOR DEPRESSIVE DISORDER (H): Status: ACTIVE | Noted: 2018-01-30

## 2019-04-26 ENCOUNTER — HOSPITAL ENCOUNTER (OUTPATIENT)
Dept: EDUCATION SERVICES | Facility: HOSPITAL | Age: 19
Discharge: HOME | End: 2019-04-26
Attending: NURSE PRACTITIONER | Admitting: FAMILY MEDICINE
Payer: COMMERCIAL

## 2019-04-26 VITALS
HEART RATE: 87 BPM | BODY MASS INDEX: 23.34 KG/M2 | WEIGHT: 136.7 LBS | HEIGHT: 64 IN | OXYGEN SATURATION: 97 % | RESPIRATION RATE: 16 BRPM | DIASTOLIC BLOOD PRESSURE: 93 MMHG | SYSTOLIC BLOOD PRESSURE: 135 MMHG

## 2019-04-26 DIAGNOSIS — O24.410 DIET CONTROLLED GESTATIONAL DIABETES MELLITUS (GDM) IN THIRD TRIMESTER: Primary | ICD-10-CM

## 2019-04-26 PROCEDURE — G0108 DIAB MANAGE TRN  PER INDIV: HCPCS | Performed by: DIETITIAN, REGISTERED

## 2019-04-26 ASSESSMENT — PAIN SCALES - GENERAL: PAINLEVEL: EXTREME PAIN (9)

## 2019-04-26 ASSESSMENT — ANXIETY QUESTIONNAIRES
GAD7 TOTAL SCORE: 10
7. FEELING AFRAID AS IF SOMETHING AWFUL MIGHT HAPPEN: MORE THAN HALF THE DAYS
IF YOU CHECKED OFF ANY PROBLEMS ON THIS QUESTIONNAIRE, HOW DIFFICULT HAVE THESE PROBLEMS MADE IT FOR YOU TO DO YOUR WORK, TAKE CARE OF THINGS AT HOME, OR GET ALONG WITH OTHER PEOPLE: SOMEWHAT DIFFICULT
2. NOT BEING ABLE TO STOP OR CONTROL WORRYING: MORE THAN HALF THE DAYS
1. FEELING NERVOUS, ANXIOUS, OR ON EDGE: MORE THAN HALF THE DAYS
5. BEING SO RESTLESS THAT IT IS HARD TO SIT STILL: NOT AT ALL
3. WORRYING TOO MUCH ABOUT DIFFERENT THINGS: MORE THAN HALF THE DAYS
6. BECOMING EASILY ANNOYED OR IRRITABLE: MORE THAN HALF THE DAYS

## 2019-04-26 ASSESSMENT — MIFFLIN-ST. JEOR: SCORE: 1375.07

## 2019-04-26 ASSESSMENT — PATIENT HEALTH QUESTIONNAIRE - PHQ9
5. POOR APPETITE OR OVEREATING: NOT AT ALL
SUM OF ALL RESPONSES TO PHQ QUESTIONS 1-9: 6

## 2019-04-26 NOTE — LETTER
"    4/26/2019        RE: Shirley Daniels  [address]  [place] [address]        Pt is here today with dx of GDM at 36 weeks gestation.  This is her first pregnancy.  She does not report hx of DM in family. She is accompanied by her significant other.      /93   Pulse 87   Resp 16   Ht 1.626 m (5' 4\")   Wt 62 kg (136 lb 11.2 oz)   SpO2 97%   BMI 23.46 kg/m      Prepregnancy weight noted to be 105#.      1 hour gtt result - 181    2 hour gtt results:  Fasting-92  1 hour-214  2 hours-192    Verbal diet recall obtained:  Breakfast-Trix cereal or 2 toast with peanut butter or 3 eggs, 4 swift - drinks 3 glasses milk  Snack- 6 cookies or 2 pop tarts - drinks 3 cans regular soda  Lunch-yesterday had [employer] or meat, veggies, potatoes, bread  Snack only after lunch-chips, fruit - drinks 12 cans regular soda from lunch on   Dines out 1x/week.     Pt has not been exercising but has done some walking on recent nice days.     Pt has a Relion meter and has been testing 1x/day - most often 1 hour after supper meal.  She states levels most often less than 140.     Readiness to learn: willing.     Barriers to learning: none.     Topics covered: understanding gestational diabetes, carbohydrate counting meal plan, portion control, label reading, benefits of activity as allowed by primary provider,  testing schedule/times/targets, and risks/complications, diagnosing criteria, sharps disposal, and treatment options.     Pt actively participate and demonstrated adequate understanding of topics discussed.    Plan: Follow carbohydrate counting meal plan/healthy diet for pregnancy.  Exercise as allowed by provider.  Test glucose 4x/day - fasting and 1 hour after each meal.      Follow up: April 30th    Time spent with patient: 60 minutes    DAX Alamo, CDE              Sincerely,        Nusrat Rosenberg RD    "

## 2019-04-26 NOTE — PROGRESS NOTES
"Pt is here today with dx of GDM at 36 weeks gestation.  This is her first pregnancy.  She does not report hx of DM in family. She is accompanied by her significant other.      /93   Pulse 87   Resp 16   Ht 1.626 m (5' 4\")   Wt 62 kg (136 lb 11.2 oz)   SpO2 97%   BMI 23.46 kg/m     Prepregnancy weight noted to be 105#.      1 hour gtt result - 181    2 hour gtt results:  Fasting-92  1 hour-214  2 hours-192    Verbal diet recall obtained:  Breakfast-Trix cereal or 2 toast with peanut butter or 3 eggs, 4 swift - drinks 3 glasses milk  Snack- 6 cookies or 2 pop tarts - drinks 3 cans regular soda  Lunch-yesterday had [employer] or meat, veggies, potatoes, bread  Snack only after lunch-chips, fruit - drinks 12 cans regular soda from lunch on   Dines out 1x/week.     Pt has not been exercising but has done some walking on recent nice days.     Pt has a Relion meter and has been testing 1x/day - most often 1 hour after supper meal.  She states levels most often less than 140.     Readiness to learn: willing.     Barriers to learning: none.     Topics covered: understanding gestational diabetes, carbohydrate counting meal plan, portion control, label reading, benefits of activity as allowed by primary provider,  testing schedule/times/targets, and risks/complications, diagnosing criteria, sharps disposal, and treatment options.     Pt actively participate and demonstrated adequate understanding of topics discussed.    Plan: Follow carbohydrate counting meal plan/healthy diet for pregnancy.  Exercise as allowed by provider.  Test glucose 4x/day - fasting and 1 hour after each meal.      Follow up: April 30th    Time spent with patient: 60 minutes    DAX Alamo, CDE          "

## 2019-04-26 NOTE — PATIENT INSTRUCTIONS
-Follow carbohydrate counting meal plan provided - 45 grams/meal, 15-30 grams/snack.   -Try to get some exercise daily if you have no restrictions.   -Test your glucose 4x/day - fasting, 1 hour after each meal.   -Target levels are fasting <95 and 1 hour after meals <140.  -Follow up on Tuesday April 30th at 2:30 pm.  Bring your glucose log sheet.   -Call with any questions - DAX Alamo, [phone].

## 2019-04-27 ASSESSMENT — ANXIETY QUESTIONNAIRES: GAD7 TOTAL SCORE: 10

## 2019-05-12 ENCOUNTER — ANESTHESIA EVENT (OUTPATIENT)
Dept: OBGYN | Facility: HOSPITAL | Age: 19
End: 2019-05-12
Payer: COMMERCIAL

## 2019-05-12 ENCOUNTER — ANESTHESIA (OUTPATIENT)
Dept: OBGYN | Facility: HOSPITAL | Age: 19
End: 2019-05-12
Payer: COMMERCIAL

## 2019-05-12 ENCOUNTER — HOSPITAL ENCOUNTER (INPATIENT)
Facility: HOSPITAL | Age: 19
LOS: 5 days | Discharge: HOME | End: 2019-05-17
Attending: FAMILY MEDICINE | Admitting: OBSTETRICS & GYNECOLOGY
Payer: COMMERCIAL

## 2019-05-12 DIAGNOSIS — O14.13 PREECLAMPSIA, SEVERE, THIRD TRIMESTER: ICD-10-CM

## 2019-05-12 PROBLEM — O14.90 PREECLAMPSIA: Status: ACTIVE | Noted: 2019-05-12

## 2019-05-12 LAB
ABO + RH BLD: NORMAL
ABO + RH BLD: NORMAL
ALBUMIN SERPL-MCNC: 1.6 G/DL (ref 3.4–5)
ALBUMIN SERPL-MCNC: 1.6 G/DL (ref 3.4–5)
ALBUMIN UR-MCNC: 300 MG/DL
ALP SERPL-CCNC: 337 U/L (ref 40–150)
ALP SERPL-CCNC: 351 U/L (ref 40–150)
ALT SERPL W P-5'-P-CCNC: 150 U/L (ref 0–50)
ALT SERPL W P-5'-P-CCNC: 165 U/L (ref 0–50)
AMPHETAMINES UR QL: ABNORMAL NG/ML
ANION GAP SERPL CALCULATED.3IONS-SCNC: 7 MMOL/L (ref 3–14)
ANION GAP SERPL CALCULATED.3IONS-SCNC: 9 MMOL/L (ref 3–14)
APPEARANCE UR: ABNORMAL
AST SERPL W P-5'-P-CCNC: 73 U/L (ref 0–45)
AST SERPL W P-5'-P-CCNC: 79 U/L (ref 0–45)
BACTERIA #/AREA URNS HPF: ABNORMAL /HPF
BARBITURATES UR QL SCN: NOT DETECTED NG/ML
BASOPHILS # BLD AUTO: 0 10E9/L (ref 0–0.2)
BASOPHILS NFR BLD AUTO: 0.3 %
BENZODIAZ UR QL SCN: NOT DETECTED NG/ML
BILIRUB SERPL-MCNC: 0.3 MG/DL (ref 0.2–1.3)
BILIRUB SERPL-MCNC: 0.4 MG/DL (ref 0.2–1.3)
BILIRUB UR QL STRIP: NEGATIVE
BLD GP AB SCN SERPL QL: NORMAL
BLOOD BANK CMNT PATIENT-IMP: NORMAL
BUN SERPL-MCNC: 11 MG/DL (ref 7–30)
BUN SERPL-MCNC: 12 MG/DL (ref 7–30)
BUPRENORPHINE UR QL: NOT DETECTED NG/ML
CALCIUM SERPL-MCNC: 8.8 MG/DL (ref 8.5–10.1)
CALCIUM SERPL-MCNC: 9.1 MG/DL (ref 8.5–10.1)
CANNABINOIDS UR QL: ABNORMAL NG/ML
CHLORIDE SERPL-SCNC: 106 MMOL/L (ref 94–109)
CHLORIDE SERPL-SCNC: 108 MMOL/L (ref 94–109)
CO2 SERPL-SCNC: 22 MMOL/L (ref 20–32)
CO2 SERPL-SCNC: 24 MMOL/L (ref 20–32)
COCAINE UR QL SCN: NOT DETECTED NG/ML
COLOR UR AUTO: YELLOW
CREAT SERPL-MCNC: 0.74 MG/DL (ref 0.52–1.04)
CREAT SERPL-MCNC: 0.85 MG/DL (ref 0.52–1.04)
D-METHAMPHET UR QL: ABNORMAL NG/ML
DIFFERENTIAL METHOD BLD: ABNORMAL
EOSINOPHIL # BLD AUTO: 0 10E9/L (ref 0–0.7)
EOSINOPHIL NFR BLD AUTO: 0.2 %
ERYTHROCYTE [DISTWIDTH] IN BLOOD BY AUTOMATED COUNT: 12.8 % (ref 10–15)
ERYTHROCYTE [DISTWIDTH] IN BLOOD BY AUTOMATED COUNT: 12.9 % (ref 10–15)
GFR SERPL CREATININE-BSD FRML MDRD: >90 ML/MIN/{1.73_M2}
GFR SERPL CREATININE-BSD FRML MDRD: >90 ML/MIN/{1.73_M2}
GLUCOSE BLDC GLUCOMTR-MCNC: 86 MG/DL (ref 70–99)
GLUCOSE SERPL-MCNC: 82 MG/DL (ref 70–99)
GLUCOSE SERPL-MCNC: 93 MG/DL (ref 70–99)
GLUCOSE UR STRIP-MCNC: NEGATIVE MG/DL
HBA1C MFR BLD: 5.2 % (ref 0–5.6)
HCT VFR BLD AUTO: 41.7 % (ref 35–47)
HCT VFR BLD AUTO: 42.8 % (ref 35–47)
HGB BLD-MCNC: 14.8 G/DL (ref 11.7–15.7)
HGB BLD-MCNC: 15.2 G/DL (ref 11.7–15.7)
HGB UR QL STRIP: ABNORMAL
HIV 1+2 AB+HIV1P24 AG SERPLBLD IA.RAPID: NONREACTIVE
HIV 1+2 AB+HIV1P24 AG SERPLBLD IA.RAPID: NONREACTIVE
HIV 1+2 AB+HIV1P24 AG SERPLBLD IA.RAPID: NORMAL
HIV 1+2 AB+HIV1P24 AG SERPLBLD IA.RAPID: NORMAL
HYALINE CASTS #/AREA URNS LPF: 3 /LPF
IMM GRANULOCYTES # BLD: 0.1 10E9/L (ref 0–0.4)
IMM GRANULOCYTES NFR BLD: 0.8 %
KETONES UR STRIP-MCNC: NEGATIVE MG/DL
LEUKOCYTE ESTERASE UR QL STRIP: ABNORMAL
LYMPHOCYTES # BLD AUTO: 1.9 10E9/L (ref 0.8–5.3)
LYMPHOCYTES NFR BLD AUTO: 14.8 %
MAGNESIUM SERPL-MCNC: 5.8 MG/DL (ref 1.6–2.3)
MCH RBC QN AUTO: 29.9 PG (ref 26.5–33)
MCH RBC QN AUTO: 30.2 PG (ref 26.5–33)
MCHC RBC AUTO-ENTMCNC: 35.5 G/DL (ref 31.5–36.5)
MCHC RBC AUTO-ENTMCNC: 35.5 G/DL (ref 31.5–36.5)
MCV RBC AUTO: 84 FL (ref 78–100)
MCV RBC AUTO: 85 FL (ref 78–100)
METHADONE UR QL SCN: NOT DETECTED NG/ML
MONOCYTES # BLD AUTO: 0.4 10E9/L (ref 0–1.3)
MONOCYTES NFR BLD AUTO: 3.2 %
MUCOUS THREADS #/AREA URNS LPF: PRESENT /LPF
NEUTROPHILS # BLD AUTO: 10.4 10E9/L (ref 1.6–8.3)
NEUTROPHILS NFR BLD AUTO: 80.7 %
NITRATE UR QL: NEGATIVE
NRBC # BLD AUTO: 0 10*3/UL
NRBC BLD AUTO-RTO: 0 /100
OPIATES UR QL SCN: NOT DETECTED NG/ML
OXYCODONE UR QL SCN: NOT DETECTED NG/ML
PCP UR QL SCN: NOT DETECTED NG/ML
PH UR STRIP: 7.5 PH (ref 4.7–8)
PLATELET # BLD AUTO: 352 10E9/L (ref 150–450)
PLATELET # BLD AUTO: 365 10E9/L (ref 150–450)
POTASSIUM SERPL-SCNC: 4 MMOL/L (ref 3.4–5.3)
POTASSIUM SERPL-SCNC: 4.2 MMOL/L (ref 3.4–5.3)
PROPOXYPH UR QL: NOT DETECTED NG/ML
PROT SERPL-MCNC: 5.3 G/DL (ref 6.8–8.8)
PROT SERPL-MCNC: 5.4 G/DL (ref 6.8–8.8)
RBC # BLD AUTO: 4.9 10E12/L (ref 3.8–5.2)
RBC # BLD AUTO: 5.09 10E12/L (ref 3.8–5.2)
RBC #/AREA URNS AUTO: 5 /HPF (ref 0–2)
SODIUM SERPL-SCNC: 137 MMOL/L (ref 133–144)
SODIUM SERPL-SCNC: 139 MMOL/L (ref 133–144)
SOURCE: ABNORMAL
SP GR UR STRIP: 1.02 (ref 1–1.03)
SPECIMEN EXP DATE BLD: NORMAL
SQUAMOUS #/AREA URNS AUTO: 77 /HPF (ref 0–1)
TRICYCLICS UR QL SCN: NOT DETECTED NG/ML
URATE SERPL-MCNC: 7.1 MG/DL (ref 2.6–6)
UROBILINOGEN UR STRIP-MCNC: NORMAL MG/DL (ref 0–2)
WBC # BLD AUTO: 12.8 10E9/L (ref 4–11)
WBC # BLD AUTO: 9.3 10E9/L (ref 4–11)
WBC #/AREA URNS AUTO: 93 /HPF (ref 0–5)
WBC CLUMPS #/AREA URNS HPF: PRESENT /HPF

## 2019-05-12 PROCEDURE — 37000011 ZZH ANESTHESIA WARD SERVICE: Performed by: NURSE ANESTHETIST, CERTIFIED REGISTERED

## 2019-05-12 PROCEDURE — 85027 COMPLETE CBC AUTOMATED: CPT | Performed by: FAMILY MEDICINE

## 2019-05-12 PROCEDURE — 25000125 ZZHC RX 250: Performed by: NURSE ANESTHETIST, CERTIFIED REGISTERED

## 2019-05-12 PROCEDURE — 25000128 H RX IP 250 OP 636: Performed by: NURSE ANESTHETIST, CERTIFIED REGISTERED

## 2019-05-12 PROCEDURE — 86900 BLOOD TYPING SEROLOGIC ABO: CPT | Performed by: FAMILY MEDICINE

## 2019-05-12 PROCEDURE — 86901 BLOOD TYPING SEROLOGIC RH(D): CPT | Performed by: FAMILY MEDICINE

## 2019-05-12 PROCEDURE — 00HU33Z INSERTION OF INFUSION DEVICE INTO SPINAL CANAL, PERCUTANEOUS APPROACH: ICD-10-PCS | Performed by: NURSE ANESTHETIST, CERTIFIED REGISTERED

## 2019-05-12 PROCEDURE — 36415 COLL VENOUS BLD VENIPUNCTURE: CPT | Performed by: FAMILY MEDICINE

## 2019-05-12 PROCEDURE — 87806 HIV AG W/HIV1&2 ANTB W/OPTIC: CPT | Performed by: FAMILY MEDICINE

## 2019-05-12 PROCEDURE — 72200001 ZZH LABOR CARE VAGINAL DELIVERY SINGLE: Performed by: OBSTETRICS & GYNECOLOGY

## 2019-05-12 PROCEDURE — 00000146 ZZHCL STATISTIC GLUCOSE BY METER IP

## 2019-05-12 PROCEDURE — 3E0R3BZ INTRODUCTION OF ANESTHETIC AGENT INTO SPINAL CANAL, PERCUTANEOUS APPROACH: ICD-10-PCS | Performed by: NURSE ANESTHETIST, CERTIFIED REGISTERED

## 2019-05-12 PROCEDURE — 85025 COMPLETE CBC W/AUTO DIFF WBC: CPT | Performed by: FAMILY MEDICINE

## 2019-05-12 PROCEDURE — 80349 CANNABINOIDS NATURAL: CPT | Performed by: FAMILY MEDICINE

## 2019-05-12 PROCEDURE — 80053 COMPREHEN METABOLIC PANEL: CPT | Performed by: FAMILY MEDICINE

## 2019-05-12 PROCEDURE — 80306 DRUG TEST PRSMV INSTRMNT: CPT | Performed by: FAMILY MEDICINE

## 2019-05-12 PROCEDURE — 25000125 ZZHC RX 250: Performed by: FAMILY MEDICINE

## 2019-05-12 PROCEDURE — 87653 STREP B DNA AMP PROBE: CPT | Performed by: FAMILY MEDICINE

## 2019-05-12 PROCEDURE — 87389 HIV-1 AG W/HIV-1&-2 AB AG IA: CPT | Performed by: FAMILY MEDICINE

## 2019-05-12 PROCEDURE — 25000125 ZZHC RX 250

## 2019-05-12 PROCEDURE — 83735 ASSAY OF MAGNESIUM: CPT | Performed by: FAMILY MEDICINE

## 2019-05-12 PROCEDURE — 82570 ASSAY OF URINE CREATININE: CPT | Performed by: FAMILY MEDICINE

## 2019-05-12 PROCEDURE — 25800030 ZZH RX IP 258 OP 636: Performed by: FAMILY MEDICINE

## 2019-05-12 PROCEDURE — 86850 RBC ANTIBODY SCREEN: CPT | Performed by: FAMILY MEDICINE

## 2019-05-12 PROCEDURE — 25000128 H RX IP 250 OP 636: Performed by: FAMILY MEDICINE

## 2019-05-12 PROCEDURE — 83036 HEMOGLOBIN GLYCOSYLATED A1C: CPT | Performed by: FAMILY MEDICINE

## 2019-05-12 PROCEDURE — 84550 ASSAY OF BLOOD/URIC ACID: CPT | Performed by: FAMILY MEDICINE

## 2019-05-12 PROCEDURE — 87340 HEPATITIS B SURFACE AG IA: CPT | Performed by: FAMILY MEDICINE

## 2019-05-12 PROCEDURE — 81001 URINALYSIS AUTO W/SCOPE: CPT | Performed by: FAMILY MEDICINE

## 2019-05-12 PROCEDURE — 12000000 ZZH R&B MED SURG/OB

## 2019-05-12 RX ORDER — NALOXONE HYDROCHLORIDE 0.4 MG/ML
.1-.4 INJECTION, SOLUTION INTRAMUSCULAR; INTRAVENOUS; SUBCUTANEOUS
Status: DISCONTINUED | OUTPATIENT
Start: 2019-05-12 | End: 2019-05-13 | Stop reason: CLARIF

## 2019-05-12 RX ORDER — OXYTOCIN/0.9 % SODIUM CHLORIDE 30/500 ML
1-24 PLASTIC BAG, INJECTION (ML) INTRAVENOUS CONTINUOUS
Status: DISCONTINUED | OUTPATIENT
Start: 2019-05-12 | End: 2019-05-13 | Stop reason: CLARIF

## 2019-05-12 RX ORDER — OXYTOCIN 10 [USP'U]/ML
10 INJECTION, SOLUTION INTRAMUSCULAR; INTRAVENOUS
Status: DISCONTINUED | OUTPATIENT
Start: 2019-05-12 | End: 2019-05-13 | Stop reason: CLARIF

## 2019-05-12 RX ORDER — SODIUM CHLORIDE, SODIUM LACTATE, POTASSIUM CHLORIDE, CALCIUM CHLORIDE 600; 310; 30; 20 MG/100ML; MG/100ML; MG/100ML; MG/100ML
10-125 INJECTION, SOLUTION INTRAVENOUS CONTINUOUS
Status: DISCONTINUED | OUTPATIENT
Start: 2019-05-12 | End: 2019-05-13 | Stop reason: CLARIF

## 2019-05-12 RX ORDER — MAGNESIUM SULFATE HEPTAHYDRATE 40 MG/ML
2 INJECTION, SOLUTION INTRAVENOUS
Status: DISCONTINUED | OUTPATIENT
Start: 2019-05-12 | End: 2019-05-17 | Stop reason: HOSPADM

## 2019-05-12 RX ORDER — MAGNESIUM SULFATE HEPTAHYDRATE 40 MG/ML
4 INJECTION, SOLUTION INTRAVENOUS ONCE
Status: COMPLETED | OUTPATIENT
Start: 2019-05-12 | End: 2019-05-12

## 2019-05-12 RX ORDER — METHYLERGONOVINE MALEATE 0.2 MG/ML
200 INJECTION INTRAVENOUS
Status: DISCONTINUED | OUTPATIENT
Start: 2019-05-12 | End: 2019-05-13 | Stop reason: CLARIF

## 2019-05-12 RX ORDER — LORAZEPAM 2 MG/ML
2 INJECTION INTRAMUSCULAR
Status: DISCONTINUED | OUTPATIENT
Start: 2019-05-12 | End: 2019-05-17 | Stop reason: HOSPADM

## 2019-05-12 RX ORDER — ONDANSETRON 2 MG/ML
4 INJECTION INTRAMUSCULAR; INTRAVENOUS EVERY 6 HOURS PRN
Status: DISCONTINUED | OUTPATIENT
Start: 2019-05-12 | End: 2019-05-17 | Stop reason: HOSPADM

## 2019-05-12 RX ORDER — SODIUM CHLORIDE, SODIUM LACTATE, POTASSIUM CHLORIDE, CALCIUM CHLORIDE 600; 310; 30; 20 MG/100ML; MG/100ML; MG/100ML; MG/100ML
INJECTION, SOLUTION INTRAVENOUS CONTINUOUS
Status: DISCONTINUED | OUTPATIENT
Start: 2019-05-12 | End: 2019-05-13 | Stop reason: CLARIF

## 2019-05-12 RX ORDER — DIPHENHYDRAMINE HYDROCHLORIDE 50 MG/ML
25 INJECTION INTRAMUSCULAR; INTRAVENOUS EVERY 6 HOURS PRN
Status: DISCONTINUED | OUTPATIENT
Start: 2019-05-12 | End: 2019-05-17 | Stop reason: HOSPADM

## 2019-05-12 RX ORDER — ACETAMINOPHEN 325 MG/1
650 TABLET ORAL EVERY 4 HOURS PRN
Status: DISCONTINUED | OUTPATIENT
Start: 2019-05-12 | End: 2019-05-13

## 2019-05-12 RX ORDER — MAGNESIUM SULFATE HEPTAHYDRATE 40 MG/ML
4 INJECTION, SOLUTION INTRAVENOUS
Status: DISCONTINUED | OUTPATIENT
Start: 2019-05-12 | End: 2019-05-17 | Stop reason: HOSPADM

## 2019-05-12 RX ORDER — LIDOCAINE HYDROCHLORIDE AND EPINEPHRINE 15; 5 MG/ML; UG/ML
3 INJECTION, SOLUTION EPIDURAL
Status: COMPLETED | OUTPATIENT
Start: 2019-05-12 | End: 2019-05-12

## 2019-05-12 RX ORDER — DIPHENHYDRAMINE HCL 25 MG
25 CAPSULE ORAL EVERY 6 HOURS PRN
Status: DISCONTINUED | OUTPATIENT
Start: 2019-05-12 | End: 2019-05-17 | Stop reason: HOSPADM

## 2019-05-12 RX ORDER — IBUPROFEN 800 MG/1
800 TABLET, FILM COATED ORAL
Status: DISCONTINUED | OUTPATIENT
Start: 2019-05-12 | End: 2019-05-13

## 2019-05-12 RX ORDER — CALCIUM GLUCONATE 94 MG/ML
1 INJECTION, SOLUTION INTRAVENOUS
Status: DISCONTINUED | OUTPATIENT
Start: 2019-05-12 | End: 2019-05-17 | Stop reason: HOSPADM

## 2019-05-12 RX ORDER — EPHEDRINE SULFATE 50 MG/ML
5 INJECTION, SOLUTION INTRAVENOUS
Status: DISCONTINUED | OUTPATIENT
Start: 2019-05-12 | End: 2019-05-13 | Stop reason: CLARIF

## 2019-05-12 RX ORDER — OXYTOCIN/0.9 % SODIUM CHLORIDE 30/500 ML
100-340 PLASTIC BAG, INJECTION (ML) INTRAVENOUS CONTINUOUS PRN
Status: DISCONTINUED | OUTPATIENT
Start: 2019-05-12 | End: 2019-05-13 | Stop reason: CLARIF

## 2019-05-12 RX ORDER — TERBUTALINE SULFATE 1 MG/ML
0.25 INJECTION, SOLUTION SUBCUTANEOUS
Status: DISCONTINUED | OUTPATIENT
Start: 2019-05-12 | End: 2019-05-13 | Stop reason: CLARIF

## 2019-05-12 RX ORDER — MAGNESIUM SULFATE IN WATER 40 MG/ML
2 INJECTION, SOLUTION INTRAVENOUS CONTINUOUS
Status: DISCONTINUED | OUTPATIENT
Start: 2019-05-12 | End: 2019-05-13

## 2019-05-12 RX ORDER — LIDOCAINE 40 MG/G
CREAM TOPICAL
Status: DISCONTINUED | OUTPATIENT
Start: 2019-05-12 | End: 2019-05-13 | Stop reason: CLARIF

## 2019-05-12 RX ORDER — MAGNESIUM SULFATE HEPTAHYDRATE 500 MG/ML
4 INJECTION, SOLUTION INTRAMUSCULAR; INTRAVENOUS
Status: DISCONTINUED | OUTPATIENT
Start: 2019-05-12 | End: 2019-05-17 | Stop reason: HOSPADM

## 2019-05-12 RX ORDER — NIFEDIPINE 10 MG/1
10 CAPSULE ORAL
Status: DISCONTINUED | OUTPATIENT
Start: 2019-05-12 | End: 2019-05-13

## 2019-05-12 RX ORDER — EPHEDRINE SULFATE 50 MG/ML
INJECTION, SOLUTION INTRAVENOUS
Status: COMPLETED
Start: 2019-05-12 | End: 2019-05-12

## 2019-05-12 RX ORDER — CARBOPROST TROMETHAMINE 250 UG/ML
250 INJECTION, SOLUTION INTRAMUSCULAR
Status: DISCONTINUED | OUTPATIENT
Start: 2019-05-12 | End: 2019-05-17 | Stop reason: HOSPADM

## 2019-05-12 RX ORDER — OXYCODONE AND ACETAMINOPHEN 5; 325 MG/1; MG/1
1 TABLET ORAL
Status: DISCONTINUED | OUTPATIENT
Start: 2019-05-12 | End: 2019-05-12

## 2019-05-12 RX ADMIN — MAGNESIUM SULFATE IN WATER 4 G: 40 INJECTION, SOLUTION INTRAVENOUS at 11:51

## 2019-05-12 RX ADMIN — SODIUM CHLORIDE 2.5 MILLION UNITS: 9 INJECTION, SOLUTION INTRAVENOUS at 20:56

## 2019-05-12 RX ADMIN — MAGNESIUM SULFATE IN WATER 2 G/HR: 40 INJECTION, SOLUTION INTRAVENOUS at 12:29

## 2019-05-12 RX ADMIN — EPHEDRINE SULFATE 5 MG: 50 INJECTION, SOLUTION INTRAVENOUS at 22:36

## 2019-05-12 RX ADMIN — SODIUM CHLORIDE, POTASSIUM CHLORIDE, SODIUM LACTATE AND CALCIUM CHLORIDE 500 ML: 600; 310; 30; 20 INJECTION, SOLUTION INTRAVENOUS at 15:19

## 2019-05-12 RX ADMIN — EPHEDRINE SULFATE 5 MG: 50 INJECTION, SOLUTION INTRAVENOUS at 16:32

## 2019-05-12 RX ADMIN — EPHEDRINE SULFATE 5 MG: 50 INJECTION, SOLUTION INTRAVENOUS at 16:34

## 2019-05-12 RX ADMIN — SODIUM CHLORIDE 5 MILLION UNITS: 9 INJECTION, SOLUTION INTRAVENOUS at 12:26

## 2019-05-12 RX ADMIN — Medication 8 ML/HR: at 16:05

## 2019-05-12 RX ADMIN — SODIUM CHLORIDE, POTASSIUM CHLORIDE, SODIUM LACTATE AND CALCIUM CHLORIDE 25 ML/HR: 600; 310; 30; 20 INJECTION, SOLUTION INTRAVENOUS at 12:25

## 2019-05-12 RX ADMIN — SODIUM CHLORIDE 2.5 MILLION UNITS: 9 INJECTION, SOLUTION INTRAVENOUS at 16:17

## 2019-05-12 RX ADMIN — Medication 3 ML: at 15:58

## 2019-05-12 RX ADMIN — OXYTOCIN-SODIUM CHLORIDE 0.9% IV SOLN 30 UNIT/500ML 2 MILLI-UNITS/MIN: 30-0.9/5 SOLUTION at 18:39

## 2019-05-12 ASSESSMENT — MIFFLIN-ST. JEOR: SCORE: 1340.14

## 2019-05-12 NOTE — PLAN OF CARE
Labor Admission  Shirley Daniels  MRN: [id number]  Gestational Age: 38w3d      Shirley Daniels is admitted for active labor.  States arjun since 0900.  Rates pain at 10/10.  Denies bleeding.  Denies LOF.         Patient is alert and oriented X 3,Patient oriented to room, unit, hourly rounding, and plan of care.  Call light within reach. Explained admission packet with patient bill of rights brochure. Will continue to monitor and document as needed.     Inpatient nursing criteria listed below was met:    Health care directives status obtained and documented: Yes  Patient identifies a surrogate decision maker: Yes   If yes, who:Mother / Ginger  Core Measure diagnosis present:: No  Vaccine assessment done and vaccines ordered if appropriate. Yes  Clergy visit ordered if patient requests: N/A  Skin issues/needs documented:N/A  Isolation needs addressed, if appropriate: N/A  Fall Prevention (Med and High risk): Care plan updated, Education given and documented and signage used: N/A  Care Plan initiated: Yes  Education Documented (Reminder to educate patient if MRSA is present on admission): Yes  Education Assessment documented:Yes  Patient has discharge needs (If yes, please explain): No

## 2019-05-12 NOTE — PROGRESS NOTES
Contractions becoming more painful again 6/10, and requesting epidural. MgS04 maintenance and PCN being given IV.  BP's 150-160's/ 100-110's with Nifedipine prn if 2 sustained BP's above 160/110. AST elevated 73, .  SCE 1/10/-4. FHT category 1. Patellar DTR +3.   Severe pre-eclampsia, induction of labor.  -continue Dr. Turpin's cares  -continue MgSo4, PCN  -seizure precautions, frequent vitals  -CMP/CBC lab monitoring

## 2019-05-12 NOTE — ANESTHESIA PREPROCEDURE EVALUATION
Anesthesia Pre-Procedure Evaluation    Patient: Shirley Daniels   MRN: [id number] : 2000          Preoperative Diagnosis: * No pre-op diagnosis entered *    * No procedures listed *    No past medical history on file.  Past Surgical History:   Procedure Laterality Date     HC REMOVE TONSILS/ADENOIDS,<13 Y/O       SURGICAL PATHOLOGY EXAM      removal of papiloma       Anesthesia Evaluation       history and physical reviewed . Pt has had prior anesthetic.     No history of anesthetic complications          ROS/MED HX    ENT/Pulmonary:  - neg pulmonary ROS     Neurologic:  - neg neurologic ROS     Cardiovascular:     (+) --PIH, --. : . . . :. .       METS/Exercise Tolerance:     Hematologic:         Musculoskeletal:         GI/Hepatic:  - neg GI/hepatic ROS       Renal/Genitourinary:         Endo:         Psychiatric:         Infectious Disease:         Malignancy:         Other:                       (+) pre-eclampsia  HELLP        Physical Exam  Normal systems: cardiovascular, pulmonary and dental    Airway   Mallampati: II  TM distance: > 3 FB  Neck ROM: full  Mouth opening: > 3 cm    Dental     Cardiovascular       Pulmonary     Other findings: Positive drug screen  On Mag        Lab Results   Component Value Date    WBC 12.8 (H) 2019    HGB 15.2 2019    HCT 42.8 2019     2019    CRP <2.9 2016    SED 8 2016     2019    POTASSIUM 4.2 2019    CHLORIDE 106 2019    CO2 22 2019    BUN 11 2019    CR 0.74 2019    GLC 82 2019    GRETA 9.1 2019    ALBUMIN 1.6 (L) 2019    PROTTOTAL 5.3 (L) 2019     (H) 2019    AST 73 (H) 2019    ALKPHOS 337 (H) 2019    BILITOTAL 0.4 2019    INR 1.27 (H) 2014    HCG Negative 2018    HCGS Negative 2016       Preop Vitals  BP Readings from Last 3 Encounters:   19 (!) 170/106   19 135/93   19 128/75    Pulse  "Readings from Last 3 Encounters:   05/12/19 75   04/26/19 87   09/17/18 86      Resp Readings from Last 3 Encounters:   05/12/19 16   04/26/19 16   01/06/19 16    SpO2 Readings from Last 3 Encounters:   05/12/19 98%   04/26/19 97%   01/06/19 98%      Temp Readings from Last 1 Encounters:   05/12/19 98.4  F (36.9  C) (Oral)    Ht Readings from Last 1 Encounters:   05/12/19 1.575 m (5' 2\")      Wt Readings from Last 1 Encounters:   05/12/19 61.7 kg (136 lb)    Estimated body mass index is 24.87 kg/m  as calculated from the following:    Height as of this encounter: 1.575 m (5' 2\").    Weight as of this encounter: 61.7 kg (136 lb).       Anesthesia Plan      History & Physical Review      ASA Status:  .  OB Epidural Asa: 3       Plan for     Discussed risks and benefits with patient including itching, sore back, infection, hematoma, spinal headache, CV complications, inability to place, nerve damage. Pt wishes to proceed.       Postoperative Care      Consents  Anesthetic plan, risks, benefits and alternatives discussed with:  Patient..                 XOCHITL Lima CRNA  "

## 2019-05-12 NOTE — H&P
Pt discussed with Dr. Nur earlier today and plan was developed.  Pt exhibiting preeclampsia with severe features. I will assume care at this time. Pt currently on magnesium. Plan to initiated pitocin. She is getting an epidural at this time.    Will plan on Dr. Nur doing delivery unless unforeseen complications arise    TERESITA BAER MD

## 2019-05-12 NOTE — ANESTHESIA PROCEDURE NOTES
Peripheral nerve/Neuraxial procedure note : epidural catheter  Pre-Procedure  Performed by   Referred by BUFFY  Location: OB    Procedure Times:5/12/2019 3:48 PM and 5/12/2019 4:06 PM  Pre-Anesthestic Checklist: patient identified, IV checked, risks and benefits discussed, informed consent, monitors and equipment checked, pre-op evaluation and at physician/surgeon's request    Timeout  Correct Patient: Yes   Correct Procedure: Yes   Correct Site: Yes   Correct Laterality: N/A   Correct Position: Yes   Site Marked: N/A   .   Procedure Documentation    Diagnosis:labor pain.    Procedure:    Epidural catheter.  Insertion Site:L3-4  (midline approach) Injection technique: LORT saline   Local skin infiltrated with 3 mL of 1% lidocaine.  KISHA at 6 cm     Patient Prep;mask, sterile gloves, chlorhexidine gluconate and isopropyl alcohol, patient draped.  .  Needle: Touhy needle Needle Gauge: 18.    Needle Length (Inches) 3.5  # of attempts: 1 and # of redirects:  .   Catheter: 20 G . .  Catheter threaded easily  4 cm epidural space.  10 cm at skin.   .    Assessment/Narrative  Paresthesias: No.  .  .  Aspiration negative for heme or CSF  . Test dose of 3 mL lidocaine 1.5% w/ 1:200,000 epinephrine at 15:58.  Test dose negative for signs of intravascular, subdural or intrathecal injection. Comments:  No complications. Pt low back curve to the right but easily placed.   LOT [id number]  8/31/2020

## 2019-05-12 NOTE — PLAN OF CARE
"Pt tearful, rating pain 4-5 with contractions, would like epidural for pain management. Notified Dr. Nur and Anesthesia.  Pt sat up for epidural 1558 and completed and laid back down at 1606. 1625 Pt reports dizziness with nausea, low blood pressures as charted. FHT dropped to 70s-80s, 2 decelerations greater than 60 seconds noted.  Patient turned to side, oxygen started via mask, mag stopped, 5 mg ephedrine given twice.  Pressure stabalized, pt reports feeling \"better\".  Dr. Turpin notified.  Remains stable while side laying. Garrison started, continues to remain stable.  Magnesium restarted as charted. Pitocin started as charted per MD.   "

## 2019-05-12 NOTE — PLAN OF CARE
OB Triage Note  Shirley Daniels  MRN: [id number]  Gestational Age: 38w3d      Shirley Daniels presents for contractions (sign/symptom/concern).      States arjun since 0900.   Bleeding: denies.  Denies LOF.      Dr. Nur notified of arrival and condition.  Oriented patient to surroundings. Call light within reach.     FHT: 110  NST Start Time: 0939  NST Stop Time: 1018  NST: Reactive.  Uterine Assessment:Contractions: frequency q 15 minutes of mild quality.    Plan:  -Initial NST, then fetal/uterine monitoring per MD/patient plan.  -Sterile vaginal exam/sterile speculum exam.  -Nursing education on early labor, hydration and rest results provided.

## 2019-05-12 NOTE — H&P
Admitted:     2019      DATE OF SERVICE:  2019      HISTORY OF PRESENT ILLNESS:  18-year-old  at 38 and 3 weeks' gestational age by first trimester ultrasound, presents with complaints of contractions, onset of contractions this morning at 8:30 a.m. now for 3 hours and persisting and progressively worsening discomfort. She also complains of a severe headache 8/10 for 2 days, unresolved with acetaminophen.  Blood pressures in OB triage range from 150-190 systolic over 110-130 diastolic. She denies diplopia, abdominal pain and has had mild right abdominal pain, 3/10 for the past day with contractions rated at 8/10.  Patient will be admitted for severe preeclampsia management and induction.        PRENATAL COURSE:  The patient initially had good compliance with OB visits, but did not show for last 6 weeks of appointments.  She has admitted to marijuana throughout the course of her pregnancy with a urine drug screen today also positive for methamphetamine.  She admits for use of methamphetamine this past week.  Prenatal course pertinent for history of depression and anxiety, which she was started on sertraline 25 mg, but discontinued this medication on her own.  She does have a history of methamphetamine and marijuana use in the past preceding pregnancy as well.        PRENATAL LABS:  O positive, antibody negative, hepatitis B surface antigen nonreactive, HIV nonreactive, rubella immune, syphilis nonreactive, 1-hour , with 3-hour  showing gestational diabetes and patient started on medical nutrition therapy.  She did consult diabetic education and was checking her sugars until the past 6 weeks, also without visit compliance during that time.      IMAGING:  Transvaginal ultrasound 18 at 12 weeks 5 days, single, living intrauterine gestation.  2019, transabdominal anatomy scan dating 20 weeks 6 days,  g plus minus 57 grams.  No fetal anomalies seen and no placenta previa.       MEDICATIONS:  Prenatal vitamins, previously sertraline 25 mg daily.      ALLERGIES:  NO KNOWN DRUG ALLERGIES.      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        SOCIAL HISTORY:  Michael, the father of baby is involved in this pregnancy.  Foster Mom is present at OB triage today and has not known about issues with Shirley's compliance for appointments.  Shirley denies nicotine smoke or IV drug use, but does admit for methamphetamine and THC smoking.      REVIEW OF SYSTEMS:  The patient otherwise feel she was doing well until 2 days ago with onset of severe frontal headache, 8/10, unresolved with Tylenol and 1 day ago with right mild 2-3/10 abdominal discomfort.  No vaginal bleeding,  labor or contractions preceding this morning.  Otherwise, 11-point review of systems is negative exam.        PHYSICAL EXAMINATION:     Temp 97.3  /117   Pulse 75    Resp 16  SaO2 98% on RA  GENERAL:  The patient is alert and oriented x3 with discomfort with contractions.   HEENT:  PERRLA, EOMI.  Oropharynx without erythema or lesion.   NECK:  Supple.  Good range of motion without thyromegaly.   CARDIOVASCULAR:  Regular rate and rhythm without murmur, gallop or rub.   RESPIRATORY:  Clear to auscultation bilaterally without rales, rhonchi, wheezes.   ABDOMEN:  Gravid, nontender.  Bowel sounds present.   EXTREMITIES:  With +1 nonpitting ankle edema   INTEGUMENT:  Without breakdown or rash.   NEURO: CN II-XII intact without focalizing lesions, no papilledema, with +3 bilaterally patellar deep tendon reflexes.     LABORATORY DATA:  BMP normal. Alk phos 337    AST 73.  Uric acid slightly elevated at 7.6.  Type and screen pending.  Gonorrhea, chlamydia urine void pending.  RPR pending.  HIV pending.  Hepatitis B surface antigen pending.  Urine drug screen positive for THC  and methamphetamine with confirmatory testing pending. A1c 5.2     IMPRESSION:  A 18-year-old  at 38 and 3 weeks' gestational age here with severe preeclampsia.   1.  Severe preeclampsia.  The patient has had sequential blood pressures outside of contractions that reach criteria for severe preeclampsia.  She also notes a headache over the past 2 days unresolved with APAP.  Case was discussed with Dr. Johnson, the on-call obstetrician and the patient will be admitted for active management with magnesium sulfate bolus and maintenance as well as monitoring, seizure precautions and active induction.  Plan to continue CMP, CBC, labs every 4 hours and moderate blood pressure systolic over 160, diastolic over 110 with nifedipine as needed.  I did review risks of personal and fetal risk of harm and death with the patient not limited but including liver renal injury and stroke.  Pediatrician was also consulted to be available at delivery due to complicated course.   3.  Gestational diabetes.  A1c reassuring, but without adequate monitoring for 6 weeks.  We will plan for  glucose management as needed.   4.  History of depression and anxiety.  The patient does admit that she does believe her depression has worsened off of the Zoloft, which may have contributed to poor compliance.  Plan to restart medication.   5.  History of methamphetamine and THC use.   will be contacted.         FALGUNI WESTBROOK MD             D: 2019   T: 2019   MT: ADRYAN      Name:     DIANA GONZALEZ   MRN:      [id number]        Account:      [id number]   :      2000        Admitted:     2019                   Document: [id number]

## 2019-05-13 LAB
GP B STREP DNA SPEC QL NAA+PROBE: NEGATIVE
MAGNESIUM SERPL-MCNC: 8.2 MG/DL (ref 1.6–2.3)
MAGNESIUM SERPL-MCNC: 9.3 MG/DL (ref 1.6–2.3)
SPECIMEN SOURCE: NORMAL

## 2019-05-13 PROCEDURE — 36415 COLL VENOUS BLD VENIPUNCTURE: CPT | Performed by: OBSTETRICS & GYNECOLOGY

## 2019-05-13 PROCEDURE — 25800030 ZZH RX IP 258 OP 636

## 2019-05-13 PROCEDURE — 36415 COLL VENOUS BLD VENIPUNCTURE: CPT | Performed by: FAMILY MEDICINE

## 2019-05-13 PROCEDURE — 83735 ASSAY OF MAGNESIUM: CPT | Performed by: OBSTETRICS & GYNECOLOGY

## 2019-05-13 PROCEDURE — 59409 OBSTETRICAL CARE: CPT | Performed by: OBSTETRICS & GYNECOLOGY

## 2019-05-13 PROCEDURE — 12000000 ZZH R&B MED SURG/OB

## 2019-05-13 PROCEDURE — 25000128 H RX IP 250 OP 636: Performed by: OBSTETRICS & GYNECOLOGY

## 2019-05-13 PROCEDURE — 25000128 H RX IP 250 OP 636: Performed by: FAMILY MEDICINE

## 2019-05-13 PROCEDURE — 25000132 ZZH RX MED GY IP 250 OP 250 PS 637: Performed by: OBSTETRICS & GYNECOLOGY

## 2019-05-13 PROCEDURE — 25000128 H RX IP 250 OP 636

## 2019-05-13 PROCEDURE — 83735 ASSAY OF MAGNESIUM: CPT | Performed by: FAMILY MEDICINE

## 2019-05-13 PROCEDURE — 25800030 ZZH RX IP 258 OP 636: Performed by: FAMILY MEDICINE

## 2019-05-13 RX ORDER — NIFEDIPINE 10 MG/1
10 CAPSULE ORAL
Status: DISCONTINUED | OUTPATIENT
Start: 2019-05-13 | End: 2019-05-13 | Stop reason: CLARIF

## 2019-05-13 RX ORDER — OXYTOCIN/0.9 % SODIUM CHLORIDE 30/500 ML
100 PLASTIC BAG, INJECTION (ML) INTRAVENOUS CONTINUOUS
Status: DISCONTINUED | OUTPATIENT
Start: 2019-05-13 | End: 2019-05-13 | Stop reason: CLARIF

## 2019-05-13 RX ORDER — OXYTOCIN/0.9 % SODIUM CHLORIDE 30/500 ML
340 PLASTIC BAG, INJECTION (ML) INTRAVENOUS CONTINUOUS PRN
Status: DISCONTINUED | OUTPATIENT
Start: 2019-05-13 | End: 2019-05-17 | Stop reason: HOSPADM

## 2019-05-13 RX ORDER — NALOXONE HYDROCHLORIDE 0.4 MG/ML
.1-.4 INJECTION, SOLUTION INTRAMUSCULAR; INTRAVENOUS; SUBCUTANEOUS
Status: DISCONTINUED | OUTPATIENT
Start: 2019-05-13 | End: 2019-05-13 | Stop reason: CLARIF

## 2019-05-13 RX ORDER — LABETALOL HYDROCHLORIDE 5 MG/ML
20 INJECTION, SOLUTION INTRAVENOUS ONCE
Status: DISCONTINUED | OUTPATIENT
Start: 2019-05-13 | End: 2019-05-14

## 2019-05-13 RX ORDER — NIFEDIPINE 30 MG/1
30 TABLET, EXTENDED RELEASE ORAL DAILY
Status: DISCONTINUED | OUTPATIENT
Start: 2019-05-13 | End: 2019-05-14

## 2019-05-13 RX ORDER — LANOLIN 100 %
OINTMENT (GRAM) TOPICAL
Status: DISCONTINUED | OUTPATIENT
Start: 2019-05-13 | End: 2019-05-13 | Stop reason: CLARIF

## 2019-05-13 RX ORDER — AMOXICILLIN 250 MG
1 CAPSULE ORAL 2 TIMES DAILY
Status: DISCONTINUED | OUTPATIENT
Start: 2019-05-13 | End: 2019-05-17 | Stop reason: HOSPADM

## 2019-05-13 RX ORDER — BISACODYL 10 MG
10 SUPPOSITORY, RECTAL RECTAL DAILY PRN
Status: DISCONTINUED | OUTPATIENT
Start: 2019-05-15 | End: 2019-05-14 | Stop reason: CLARIF

## 2019-05-13 RX ORDER — HYDROCORTISONE 2.5 %
CREAM (GRAM) TOPICAL 3 TIMES DAILY PRN
Status: DISCONTINUED | OUTPATIENT
Start: 2019-05-13 | End: 2019-05-13 | Stop reason: CLARIF

## 2019-05-13 RX ORDER — AMOXICILLIN 250 MG
2 CAPSULE ORAL 2 TIMES DAILY
Status: DISCONTINUED | OUTPATIENT
Start: 2019-05-13 | End: 2019-05-17 | Stop reason: HOSPADM

## 2019-05-13 RX ORDER — OXYTOCIN 10 [USP'U]/ML
10 INJECTION, SOLUTION INTRAMUSCULAR; INTRAVENOUS
Status: DISCONTINUED | OUTPATIENT
Start: 2019-05-13 | End: 2019-05-17 | Stop reason: HOSPADM

## 2019-05-13 RX ORDER — MAGNESIUM SULFATE IN WATER 40 MG/ML
1 INJECTION, SOLUTION INTRAVENOUS CONTINUOUS
Status: DISCONTINUED | OUTPATIENT
Start: 2019-05-13 | End: 2019-05-14

## 2019-05-13 RX ORDER — IBUPROFEN 800 MG/1
800 TABLET, FILM COATED ORAL EVERY 6 HOURS PRN
Status: DISCONTINUED | OUTPATIENT
Start: 2019-05-13 | End: 2019-05-16

## 2019-05-13 RX ORDER — OXYCODONE HYDROCHLORIDE 5 MG/1
5 TABLET ORAL EVERY 4 HOURS PRN
Status: DISCONTINUED | OUTPATIENT
Start: 2019-05-13 | End: 2019-05-17 | Stop reason: HOSPADM

## 2019-05-13 RX ORDER — ACETAMINOPHEN 325 MG/1
650 TABLET ORAL EVERY 4 HOURS PRN
Status: DISCONTINUED | OUTPATIENT
Start: 2019-05-13 | End: 2019-05-17 | Stop reason: HOSPADM

## 2019-05-13 RX ADMIN — BUPIVACAINE HYDROCHLORIDE 8 ML/HR: 5 INJECTION, SOLUTION EPIDURAL; INTRACAUDAL at 04:36

## 2019-05-13 RX ADMIN — NIFEDIPINE 10 MG: 10 CAPSULE ORAL at 19:07

## 2019-05-13 RX ADMIN — SODIUM CHLORIDE 2.5 MILLION UNITS: 9 INJECTION, SOLUTION INTRAVENOUS at 09:41

## 2019-05-13 RX ADMIN — SODIUM CHLORIDE 2.5 MILLION UNITS: 9 INJECTION, SOLUTION INTRAVENOUS at 01:12

## 2019-05-13 RX ADMIN — SODIUM CHLORIDE 2.5 MILLION UNITS: 9 INJECTION, SOLUTION INTRAVENOUS at 13:27

## 2019-05-13 RX ADMIN — MAGNESIUM SULFATE IN WATER 2 G/HR: 40 INJECTION, SOLUTION INTRAVENOUS at 20:04

## 2019-05-13 RX ADMIN — MAGNESIUM SULFATE IN WATER 2 G/HR: 40 INJECTION, SOLUTION INTRAVENOUS at 11:16

## 2019-05-13 RX ADMIN — MAGNESIUM SULFATE IN WATER 2 G/HR: 40 INJECTION, SOLUTION INTRAVENOUS at 20:10

## 2019-05-13 RX ADMIN — SODIUM CHLORIDE 2.5 MILLION UNITS: 9 INJECTION, SOLUTION INTRAVENOUS at 05:16

## 2019-05-13 RX ADMIN — Medication 8 ML/HR: at 04:36

## 2019-05-13 RX ADMIN — NIFEDIPINE 30 MG: 30 TABLET, EXTENDED RELEASE ORAL at 22:08

## 2019-05-13 RX ADMIN — MAGNESIUM SULFATE IN WATER 2 G/HR: 40 INJECTION, SOLUTION INTRAVENOUS at 01:10

## 2019-05-13 NOTE — PHARMACY
[facility]    Pharmacy      Antimicrobial Stewardship Note     Current antimicrobial therapy:  Anti-infectives (From now, onward)    Start     Dose/Rate Route Frequency Ordered Stop    05/12/19 1532  penicillin G potassium 2.5 Million Units in sodium chloride 0.9 % 100 mL intermittent infusion      2.5 Million Units  over 30 Minutes Intravenous EVERY 4 HOURS 05/12/19 1132            Indication: Group B strep    Days of Therapy: 2     Pertinent labs:  Creatinine   Creatinine   Date Value Ref Range Status   05/12/2019 0.74 0.52 - 1.04 mg/dL Final   05/12/2019 0.85 0.52 - 1.04 mg/dL Final   08/19/2018 0.89 0.50 - 1.00 mg/dL Final     WBC   WBC   Date Value Ref Range Status   05/12/2019 12.8 (H) 4.0 - 11.0 10e9/L Final   05/12/2019 9.3 4.0 - 11.0 10e9/L Final   08/19/2018 16.0 (H) 4.0 - 11.0 10e9/L Final     Procalcitonin No results found for: PCAL  CRP   CRP Inflammation   Date Value Ref Range Status   12/18/2016 <2.9 0.0 - 8.0 mg/L Final   05/19/2016 <2.9 0.0 - 8.0 mg/L Final   01/16/2016 <2.9 0.0 - 8.0 mg/L Final       Culture Results:   Group B strep PCR [[id number]] Collected: 05/12/19 1213   Order Status: Completed Lab Status: In process Updated: 05/12/19 1217   Specimen: Genital, vaginal    GC/Chlamydia by PCR - HI,    Order Status: No result Lab Status: No result    Specimen: Urine    Rupture of Fetal Membranes by ROM Plus    Order Status: No result Lab Status: No result    Specimen: Amniotic fluid           Recommendations/Interventions:  1. No recommendations at this time.    Fransisco Gomez [facility]  May 13, 2019

## 2019-05-13 NOTE — PROCEDURES
Delivery Note      Pt fully dilated and pushing delivered viable male (see nurses notes for weight and APGARS). No nuchal cords body delivered. Cord clamped and cut after 30 seconds. Cord blood drawn placenta delivered intact. Small anterior lacerations but no repair needed. Good hemostasis.  ml.     Mom and baby stable     TERESITA BAER MD

## 2019-05-13 NOTE — PROVIDER NOTIFICATION
Notifed MD of labor status update: FHT, uterine activity, current pitocin levels, and SVE. Revieved new orders to keep pitocin at current level, see MD RAMA will evaluate pt when he arrives this AM.

## 2019-05-13 NOTE — PROGRESS NOTES
Pt had brief decel with position change, now recovered. Continue to monitor. Has made cervical change.    Magnesium and pitocin running    TERESITA BAER MD

## 2019-05-13 NOTE — PLAN OF CARE
Face to face report given with opportunity to observe patient.    Report given to Corrine Sanchez   5/12/2019  7:28 PM

## 2019-05-13 NOTE — PLAN OF CARE
Labor Shift Note  Data: See Flowsheets activity for contraction and fetal documentation.   Vitals:    19 0024 19 0029 19 0030 19 0034   BP:    134/70   Pulse:       Resp:       Temp:       TempSrc:       SpO2: 96% 95% 95%    Weight:       Height:       Signs and symptoms of infection Absent.    Complications in labor: Preeclampsia, pt on Magnesium, see MAR, pt has significant hypotension with position changes and FHT decrease with hypotensive episodes. This happens if pt is moved from the left side lying position. Pitocin infusing, see MAR. Support person adoptive mother, biological mother and significant other present at bedside.   Interventions: Continue uterine/fetal assessment per orders and nursing discretion. Vital Signs per order set. Comfort measures/pain control/labor management: Pt has epidural in place and reports complete pain control and is resting comfortably.    Plan: Anticipate . Provide labor/coping assistance as needed by patient and support person.  Observe for and notify care provider of indications of progressing labor, need for pain medications, or signs of fetal/maternal compromise.

## 2019-05-13 NOTE — PROGRESS NOTES
"Pt uncomfortable on the magnesium. Still with good pain control from the epidural    EXAM  /82   Pulse 70   Temp 98.3  F (36.8  C) (Oral)   Resp 14   Ht 1.575 m (5' 2\")   Wt 61.7 kg (136 lb)   SpO2 95%   BMI 24.87 kg/m    Gen - NAD  Abdomen - Gravid, NT  VE 2.5/50/-1  Extremities - 2+ LE edema    A/P Pt being augmented due to preeclampsia with severe features   1. Continue magnesium   2. Continue pitocin   3. Limited progress at this time but still less than 24 hours   4. Options limited medically because pt had been arjun q 3-5 min since arrival.   5. Discussed  vs continued inducted with pt and family. Plan to continue with possible balloon catheter and AROM. I would typically delay AROM but options limited and pt has not progressed as hoped on pitocin alone.    TERESITA BAER MD     "

## 2019-05-14 LAB
ALT SERPL W P-5'-P-CCNC: 123 U/L (ref 0–50)
AST SERPL W P-5'-P-CCNC: 84 U/L (ref 0–45)
ERYTHROCYTE [DISTWIDTH] IN BLOOD BY AUTOMATED COUNT: 13.2 % (ref 10–15)
HBV SURFACE AG SERPL QL IA: NONREACTIVE
HCT VFR BLD AUTO: 39.8 % (ref 35–47)
HGB BLD-MCNC: 14 G/DL (ref 11.7–15.7)
HIV 1+2 AB+HIV1 P24 AG SERPL QL IA: NONREACTIVE
MAGNESIUM SERPL-MCNC: 6.3 MG/DL (ref 1.6–2.3)
MAGNESIUM SERPL-MCNC: 6.7 MG/DL (ref 1.6–2.3)
MCH RBC QN AUTO: 29.8 PG (ref 26.5–33)
MCHC RBC AUTO-ENTMCNC: 35.2 G/DL (ref 31.5–36.5)
MCV RBC AUTO: 85 FL (ref 78–100)
PLATELET # BLD AUTO: 330 10E9/L (ref 150–450)
RBC # BLD AUTO: 4.7 10E12/L (ref 3.8–5.2)
WBC # BLD AUTO: 14.3 10E9/L (ref 4–11)

## 2019-05-14 PROCEDURE — 36415 COLL VENOUS BLD VENIPUNCTURE: CPT | Performed by: FAMILY MEDICINE

## 2019-05-14 PROCEDURE — 25800030 ZZH RX IP 258 OP 636: Performed by: OBSTETRICS & GYNECOLOGY

## 2019-05-14 PROCEDURE — 12000000 ZZH R&B MED SURG/OB

## 2019-05-14 PROCEDURE — 83735 ASSAY OF MAGNESIUM: CPT | Performed by: FAMILY MEDICINE

## 2019-05-14 PROCEDURE — 85027 COMPLETE CBC AUTOMATED: CPT | Performed by: FAMILY MEDICINE

## 2019-05-14 PROCEDURE — 25000132 ZZH RX MED GY IP 250 OP 250 PS 637: Performed by: OBSTETRICS & GYNECOLOGY

## 2019-05-14 PROCEDURE — 84460 ALANINE AMINO (ALT) (SGPT): CPT | Performed by: FAMILY MEDICINE

## 2019-05-14 PROCEDURE — 84450 TRANSFERASE (AST) (SGOT): CPT | Performed by: OBSTETRICS & GYNECOLOGY

## 2019-05-14 PROCEDURE — 25000128 H RX IP 250 OP 636: Performed by: OBSTETRICS & GYNECOLOGY

## 2019-05-14 RX ORDER — NIFEDIPINE 30 MG/1
60 TABLET, EXTENDED RELEASE ORAL DAILY
Status: DISCONTINUED | OUTPATIENT
Start: 2019-05-14 | End: 2019-05-15 | Stop reason: CLARIF

## 2019-05-14 RX ORDER — SODIUM CHLORIDE, SODIUM LACTATE, POTASSIUM CHLORIDE, CALCIUM CHLORIDE 600; 310; 30; 20 MG/100ML; MG/100ML; MG/100ML; MG/100ML
INJECTION, SOLUTION INTRAVENOUS CONTINUOUS
Status: DISCONTINUED | OUTPATIENT
Start: 2019-05-14 | End: 2019-05-14 | Stop reason: CLARIF

## 2019-05-14 RX ORDER — LABETALOL HYDROCHLORIDE 5 MG/ML
10 INJECTION, SOLUTION INTRAVENOUS
Status: DISCONTINUED | OUTPATIENT
Start: 2019-05-14 | End: 2019-05-17 | Stop reason: HOSPADM

## 2019-05-14 RX ADMIN — SENNOSIDES AND DOCUSATE SODIUM 2 TABLET: 8.6; 5 TABLET ORAL at 21:51

## 2019-05-14 RX ADMIN — SENNOSIDES AND DOCUSATE SODIUM 1 TABLET: 8.6; 5 TABLET ORAL at 08:52

## 2019-05-14 RX ADMIN — SODIUM CHLORIDE, POTASSIUM CHLORIDE, SODIUM LACTATE AND CALCIUM CHLORIDE: 600; 310; 30; 20 INJECTION, SOLUTION INTRAVENOUS at 02:55

## 2019-05-14 RX ADMIN — NIFEDIPINE 60 MG: 30 TABLET, EXTENDED RELEASE ORAL at 09:21

## 2019-05-14 RX ADMIN — MAGNESIUM SULFATE IN WATER 1 G/HR: 40 INJECTION, SOLUTION INTRAVENOUS at 03:00

## 2019-05-14 NOTE — PLAN OF CARE
Paula Reyes CPS  met with family and stated that they have come up with a plan for when Mom and baby are discharged. Shirley lives with Mother Radha. Shirley can be discharged home with baby and someone will plan to be with Shirley and baby all of the time. Shirley is suppose to update Paula when discharged so she can follow up with them.

## 2019-05-14 NOTE — PROGRESS NOTES
"Pt feels better today      EXAM  BP (!) 153/117   Pulse 72   Temp 98  F (36.7  C) (Oral)   Resp 16   Ht 1.575 m (5' 2\")   Wt 61.7 kg (136 lb)   SpO2 95%   BMI 24.87 kg/m    Gen - NAD  Abdomen - soft, non-tender  VE -mild locia  Ext - 2+ LE edema, no CT    A/P PPD # 1, s/p /induction for severe preeclampsia   1. Issues with magnesium toxicity appear resolved. Continue 1g/hour until 24 hours PP   2. HTN - increase Procardia XL to 60, plan this for longer term mgmt, keep IV Labetalol for BP >160/110    3. WIll follow AST tomorrow and await a down gilmore trend    TERESITA BAER MD    " [address] [facility] [address]  Dept: [phone]  Dept Fax: [phone]: [phone]    Visit Date: 4/12/2019    Silverio Sherwood is a 52 y.o. male who presents today for:  Chief Complaint   Patient presents with    6 Month Follow-Up    Results     ct scan     Medication Refill    Discuss Medications     for back pain / refill = see teleENC    Other     dry hands - really back sinster     HPI:     Here for back pain - he was given Valium #15 - he states he discussed it with  and he states that she gave it to him and he needs it for when the back goes out occasionally. He was last given an RX in November - he uses it only when the back goes out. He ran out in early March. He does not get this medications from any other sources. He is getting a divorce and he just lost his brother to cancer - going through a lot right now but seems     We discussed that we do not do manage chronic pain in this office and typically do not give Valium for pain, just situational anxiety - like flying or MRI. States I will give him another prescription but for further prescriptions we might need to refer to Pain Management. Discussed that if Dr. Marshall Almanzar is ok with filling the rx long-term we can definitely do that for him but we need to have him see her again to discuss long-term rx. He voiced understanding and was agreeable to this plan.      Follow-up CT abdomin/ pelvis:  Narrative   PROCEDURE: CT ABDOMEN PELVIS W IV CONTRAST       CLINICAL INFORMATION: Liver cyst, Abnormal CT of the abdomen        COMPARISON: 9/7/2018       TECHNIQUE:  Axial CT images were obtained through the abdomen and pelvis following the intravenous administration of 85 mL Isovue 370 contrast. Coronal and sagittal reformatted images were rendered All CT scans at this facility use dose modulation,    iterative reconstruction, and/or weight-based dosing when appropriate to reduce radiation dose to as low as reasonably achievable.       FINDINGS:        Limited evaluation of the lung bases appear unremarkable.        Innumerable low-density lesions are demonstrated throughout the liver. The majority of these are too small adequately characterize. However, the larger lesions measure fluid attenuation and may represent liver cysts. These appear grossly similar in size    when compared to the previous examination from 9/7/2018. The largest of these is located within the dome of the right hepatic lobe on axial image 12 measuring 1.6 x 1.3 cm. This is unchanged from prior examination. The largest left-sided lesion measures    1.4 x 1 cm. Previously this measured 1.3 x 1 cm.       The gallbladder appears normal. The spleen appears normal. The adrenal glands appear normal. The pancreas appears normal.       There is no hydronephrosis or hydroureter.       The urinary bladder appears normal. There is no evidence of bowel obstruction. The appendix appears normal. There is no free air or free fluid. No lymphadenopathy is demonstrated. No acute osseous findings are seen. Patient is status post prior posterior    lumbar fusion from L4 through L5.           Impression   1. Innumerable low-density lesions are demonstrated throughout the liver. The majority of these are too small adequately characterize. However the larger lesions measure fluid attenuation and likely represent hepatic cysts. Overall, the appearance of    these low-density lesions appear similar compared to prior examination from 9/7/2016. However, continued follow-up in 6 months to document continued stability is recommended.         He would like to come back in June when his skin rash is always most severe - has seen Dermatologist in the past - they did a skin biopsy - tried various creams/lotions - oral medications - nothing helps.      Hands are very dry for the past couple months - getting worse in the Right Ear: Hearing and external ear normal. No swelling. Left Ear: Hearing and external ear normal. No swelling. Nose: No mucosal edema or rhinorrhea. Right sinus exhibits no maxillary sinus tenderness and no frontal sinus tenderness. Left sinus exhibits no maxillary sinus tenderness and no frontal sinus tenderness. Mouth/Throat: Oropharynx is clear and moist. No oropharyngeal exudate or posterior oropharyngeal erythema. Eyes: Pupils are equal, round, and reactive to light. Conjunctivae are normal. Right eye exhibits no discharge. Left eye exhibits no discharge. Neck: Normal range of motion. Cardiovascular:   No lower extremity edema   Pulmonary/Chest: Effort normal. No respiratory distress. Musculoskeletal: He exhibits no deformity. Lumbar back: He exhibits tenderness. Lymphadenopathy:     He has no cervical adenopathy. Neurological: He is alert and oriented to person, place, and time. Coordination and gait normal.   Skin: Skin is warm and dry. Rash noted. He is not diaphoretic. No cyanosis. Nails show no clubbing. Psychiatric: He has a normal mood and affect. His speech is normal and behavior is normal. Judgment and thought content normal. Cognition and memory are normal.       Lab Results   Component Value Date    WBC 8.1 04/09/2018    HGB 14.4 04/09/2018    HCT 41.8 (L) 04/09/2018     04/09/2018    CHOL 191 11/24/2018    TRIG 89 11/24/2018    HDL 48 11/24/2018    LDLCALC 125 11/24/2018    AST 21 11/24/2018     04/09/2018    K 4.2 04/09/2018     04/09/2018    CREATININE 0.6 04/09/2018    BUN 17 04/09/2018    CO2 24 04/09/2018    TSH 2.390 04/09/2018    LABGLOM >90 04/09/2018    MG 2.1 04/09/2018    CALCIUM 9.2 04/09/2018    VITD25 10 (L) 04/09/2018       Assessment:       Diagnosis Orders   1. Colon cancer screening     2. Personal history of tobacco use  varenicline (CHANTIX CONTINUING MONTH CHRISTIAN) 1 MG tablet   3.  History of lumbar laminectomy  diazepam (VALIUM) 5 MG tablet    Amy Ontiveros MD, Pain Medicine, [facility] [address]   4. Encounter for hepatitis C screening test for low risk patient  Hepatitis C Antibody   5. Screening for HIV (human immunodeficiency virus)  HIV Screen   6. Chronic left-sided low back pain with left-sided sciatica  diazepam (VALIUM) 5 MG tablet    Maritza Levine MD, Pain Medicine, [facility] [place] [address]   7. Contact dermatitis, unspecified contact dermatitis type, unspecified trigger  Allergen Inhalant [facility] 1       Plan:   Can try Aquaphor lotion during the day and ointment at night - soak hands in warm water for 5-10 minutes, dry off and apply ointment and put on gloves before you go to bed. Will refill the Valium x one prescription  Will refer to pain management as well  Will see you back in June  Will refill the Chantix  Will see you back in June    Return in about 2 months (around 6/12/2019), or if symptoms worsen or fail to improve. Orders Placed:  Orders Placed This Encounter   Procedures    Hepatitis C Antibody    HIV Screen    Allergen Inhalant [address] Kevin Levine MD, [facility] Medicine, [facility]     Medications Prescribed:  Orders Placed This Encounter   Medications    varenicline (CHANTIX CONTINUING MONTH CHRISTIAN) 1 MG tablet     Sig: Take 1 tablet by mouth 2 times daily     Dispense:  60 tablet     Refill:  5    diazepam (VALIUM) 5 MG tablet     Sig: Take 1 tablet by mouth every 6 hours as needed for Anxiety. Dispense:  15 tablet     Refill:  0       Future Appointments   Date Time Provider [facility]   6/3/2019  2:00 PM DO KARIE HoX  [facility]   11/18/2019  8:30 AM DO KARIE HoX  RES [address] [place] [address] Road        Patient given educational materials - see patient instructions. Discussed use, benefit, and side effects of prescribedmedications. All patient questions answered. Pt voiced understanding. Reviewed health maintenance.   Instructed to continue current medications, diet and exercise. Patient agreed with treatment plan. Follow up as directed.     Electronically signed by YAHIR Ortiz CNP on 4/12/2019 at 4:58 PM

## 2019-05-14 NOTE — PLAN OF CARE
Patient bedrest. Comfortable most of the time. Patient reports vaginal pressure and discomfort during contractions; patient guided thru this with breathing. Dermatomes  Bilaterally T7 and legs continue to be heavy and difficult for patient to move. Fetal heart tones 115 baseline with minimal variability. Family at bedside providing support. Patient remains irritated with any position changes or vital sign checks.

## 2019-05-14 NOTE — PLAN OF CARE
Patient up to bathroom with standby assist of 1 walking to bathroom. Walked to bathroom, washed up, brushed teeth, jose c-care done. Patient tolerated activity. Bedding changed.

## 2019-05-14 NOTE — PLAN OF CARE
Patient had episode of nausea and vomiting. Blood pressure elevated 165/108. Reflexes hypoactive and no clonus.Dr Turpin contacted and orders for Nifidipine order over phone give 10mg now orally and repeat 10 mg dose in 30 minutes if systolic pressure above 160.

## 2019-05-14 NOTE — PROGRESS NOTES
Call received from [facility] CPS worker Paula Reyes (phone [phone]) who says she will be up about 1:00pm today to visit with Shirley.

## 2019-05-14 NOTE — PLAN OF CARE
Voided 175ml on commode, however, writer noted post void residual of 650 ml with bladder scanner.  Placed trujillo at this time with not problems.  Patient tolerated procedure well.

## 2019-05-14 NOTE — PLAN OF CARE
Ask Mother when the last time she used drugs. She denied using in the last 24 hours but states she has used in the last 48 hours.

## 2019-05-14 NOTE — PLAN OF CARE
0910 Went to administer labetalol 20mg IV for blood pressures within guidelines and patients blood pressure decreased to 145/110. Dr Turpin on unit-updated DR Turpin. Per Dr Turpin do not give IV labetalol for now-give 60mg dose of nifedipine as ordered.

## 2019-05-14 NOTE — PLAN OF CARE
"Called  regarding writer taking over on patient and noticing hypo reflexes, RR 14, is A+Ox3, but seems a little more sleepy or \"loopy\".  NO slurred speech.  Sats 98% on RA.  Order read to stop Mag, so writer did,however MD wants it to continue to run and draw stat mag level first.    "

## 2019-05-14 NOTE — DISCHARGE INSTRUCTIONS
Appointment with Dr KRYSTA Nur on Tuesday, May 21.  Please arrive at 11 am to register.    * would like appointments scheduled at 2 pm or later    Postpartum Vaginal Delivery Instructions  Understanding Preeclampsia     Your blood pressure will be monitored regularly throughout your pregnancy to help check for preeclampsia.   Preeclampsia is pregnancy-related hypertension that develops after 20 weeks' gestation. It can lead to health risks for you and your baby. No one knows what causes preeclampsia. But it is known that the only cure is delivery.  Signs and symptoms  A common sign of preeclampsia is high blood pressure. Other signs and symptoms may include:    Rapid weight gain    Protein in your urine    Headache    Abdominal pain on your right side    Vision problems (flashes or spots)    Edema (swelling) in your face or hands (this also commonly happens near the end of normal pregnancies, even without preeclampsia)  Tests you may have  Your healthcare provider will want to check your blood pressure throughout your pregnancy. If your blood pressure is high, you may have the following tests:    Urine tests to look for protein    Blood tests to confirm preeclampsia    Fetal monitoring to ensure that your baby is healthy  Treating preeclampsia  A daily low dose of aspirin may be prescribed to those at risk for preeclampsia. Preeclampsia almost always ends soon after you give birth. Until then, your healthcare provider can help manage your condition. If your symptoms are mild, you may need bed rest at home. If your symptoms are severe, you will be hospitalized. Hospital treatment includes:    Complete bed rest to help control blood pressure    Magnesium IV (intravenous) drip during labor to prevent seizures    Induced labor or surgical delivery by  section  When to call your healthcare provider  Call your healthcare provider if swelling, weight gain, or other symptoms come on quickly or are severe. Some  cases of preeclampsia are more severe than others. Your signs and symptoms also may change or worsen as you get closer to your due date.  Who s at risk?  Preeclampsia can happen in any pregnant woman. Factors that increase the risk include:    Previous pregnancies. Preeclampsia, intrauterine growth retardation (IUGR),  birth, placental abruption, or fetal death    Medical history of mother. Diabetes, high blood pressure, obesity, kidney disease, autoimmune disease (for example lupus), or family history of preeclampsia    Current pregnancy. First pregnancy, multiple fetuses, over the age of 40 years, or in vitro fertilization  Dangers of preeclampsia  If not treated, preeclampsia can cause problems for you and your baby. The placenta (organ that nourishes your baby) may tear away from the uterine wall. This can lead to fetal distress (the baby is at risk for health problems) and premature delivery. Preeclampsia can also cause these health problems:    Kidney failure or other organ damage    Seizures    Stroke  Once you give birth  In most cases, preeclampsia goes away on its own soon after you give birth. Within days of delivery, your blood pressure, swelling, and other signs should decrease.  Date Last Reviewed: 2016-2018 [employer]. [address] [place] [address]. All rights reserved. This information is not intended as a substitute for professional medical care. Always follow your healthcare professional's instructions.          Activity    Ask family and friends for help when you need it.  Do not place anything in your vagina until approved by your Physician .  You are not restricted on other activities, but take it easy for a few weeks to allow your body to recover from delivery.  You are able to do any activities you feel up to that point.  No driving until you have stopped taking narcotic pain medications.    Call your health care provider if you have any of these  symptoms:    Increased pain, swelling, redness, or fluid around your stiches from an episiotomy or perineal tear.  A fever above 100.4 F (38 C) with or without chills when placing a thermometer under your tongue.  You soak a sanitary pad with blood within 1 hour, or you see blood clots larger than a golf ball.  Bleeding that lasts more than 6 weeks.  Vaginal discharge that smells bad.  Severe pain, cramping or tenderness in your lower belly area.  A need to urinate more frequently (use the toilet more often), more urgently (use the toilet very quickly), or it burns when you urinate.  Nausea and vomiting.  Redness, swelling or pain around a vein in your leg.  Problems breastfeeding or a red or painful area on your breast.  Chest pain and cough or are gasping for air.  Problems coping with sadness, anxiety, or depression.  If you have any concerns about hurting yourself or the baby, call your provider immediately. The Safe Place for Newborns law allows you to bring your baby to the hospital up to 7 days, no questions asked.  You have questions or concerns after you return home.    Keep your hands clean:  Always wash your hands before touching your perineal area and stitches.  This helps reduce your risk of infection.  If your hands aren't dirty, you may use an alcohol hand-rub to clean your hands. Keep your nails clean and short.

## 2019-05-14 NOTE — PLAN OF CARE
Repositioning patient baby intermittently off monitor between 1149-1811. Picked up maternal heart tones at 1527&1528 in 70's.

## 2019-05-14 NOTE — PLAN OF CARE
Epidural removed. Small amount of bleeding noted. Black tip intact and bandage placed on site. No new numbness tingling presented with removal or after.

## 2019-05-14 NOTE — PROGRESS NOTES
"  FOB: Emerson Willy  Marital status: single  ROP: we discussed ROP, they have been updated that they will be able to watch a video and complete the ROP forms here if desired    Who was present during assessment: Emerson Watson, and her moms Radha and Micaela  Lives at: [facility] in [place]  Lives with: mom Radha, her dad, and her sister and sisters 2 kids  Support System: family     Primary PCP:  Stefan Nur A  Baby PCP: ERICA Nur  Insurance: Blue Cross Blue Shield and Medicaid    Mental Health: denies concerns at this time  Violence: not asked  Substance Abuse: denies smoking and alcohol use; 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

## 2019-05-14 NOTE — PLAN OF CARE
Patient resting comfortably in bed. Legs heavy and patient has difficulty moving them by herself. Dermatomes at T7 left and right. Patient reports being tired and is sleeping frequently. Easily aroused and orientated to surroundings. aggitated when trying to reposition or doing vitals. Denies any pain. Fetal heart tones reassuring.

## 2019-05-14 NOTE — PLAN OF CARE
Received call from Yoav in lab at 2042    DATE:  5/13/2019   TIME OF RECEIPT FROM LAB:  2042  LAB TEST:  Magnesium  LAB VALUE:  9.3  RESULTS GIVEN WITH READ-BACK TO (PROVIDER):  DR Papi WILLAMS  TIME LAB VALUE REPORTED TO PROVIDER:   2043

## 2019-05-14 NOTE — PLAN OF CARE
Called  with update on patient after obtaining report at the bedside.  Writer noted no lower extremity reflexes.  Also noted patient to be sleepy, which off going RN said she has been all day.  Patient's VSS, RA sats 98%.  Patient oriented and not slurring speech.  No excessive thirst or any other symptoms.  Writer told  I turned of Mag, but he wants it to stay on til we get stat mag level.  Will continue to monitor patient closely.

## 2019-05-14 NOTE — PLAN OF CARE
In room assisting with cares. Patient was previously noted to have personality be irritated with any cares, repositioning, fundal checks, and vital sign checks.   Now patient seems more drowsy, smiling- agreeable to every thing, and less control over her torso when repositioning.

## 2019-05-14 NOTE — PLAN OF CARE
0845 Up in wheelchair transfer with standby assist of 1. To nursery to see baby. Mom holding baby and bonding with baby. Dad Emerson standing at Mom's side.

## 2019-05-14 NOTE — ANESTHESIA POSTPROCEDURE EVALUATION
Patient: Shirley Daniels    * No procedures listed *    Diagnosis:* No pre-op diagnosis entered *  Diagnosis Additional Information: No value filed.    Anesthesia Type:  No value filed.    Note:  Anesthesia Post Evaluation    Level of consciousness: awake  Pain management: adequate  Airway patency: patent  Cardiovascular status: stable  Respiratory status: acceptable  Hydration status: stable  PONV: none             Last vitals:  Vitals:    05/14/19 0456 05/14/19 0807 05/14/19 0823   BP: 150/94 (!) 167/108 (!) 153/117   Pulse:      Resp:      Temp:      SpO2: 95%           Electronically Signed By: XOCHITL Lima CRNA  May 14, 2019  9:13 AM

## 2019-05-14 NOTE — PLAN OF CARE
Dr Turpin called to check on patients status/blood pressures. Magnesium infusion to be discontinued at 1630 and ok to remove trujillo catheter at that time too.

## 2019-05-14 NOTE — PLAN OF CARE
Assessments completed as charted. B/P: 152/83, T: 97.8, P: 72, R: 16. Rates pain: 0/10 . [place] Patent! Fundus: Midline . Lochia: Light. Activity: bedrest . Infant feeding: Formula.  Nifedipine XL 30mg administered PO @ 2208!  Will recheck mag level @ 0145 per MD order.  Mag sulfate continues to be turned off.    Postpartum care education provided, see Patient Education activity. Patient denies needs. Will monitor.  JAZZY SIMPSON

## 2019-05-15 LAB — AST SERPL W P-5'-P-CCNC: 48 U/L (ref 0–45)

## 2019-05-15 PROCEDURE — 25000128 H RX IP 250 OP 636: Performed by: OBSTETRICS & GYNECOLOGY

## 2019-05-15 PROCEDURE — 25000132 ZZH RX MED GY IP 250 OP 250 PS 637: Performed by: OBSTETRICS & GYNECOLOGY

## 2019-05-15 PROCEDURE — 12000000 ZZH R&B MED SURG/OB

## 2019-05-15 PROCEDURE — 36415 COLL VENOUS BLD VENIPUNCTURE: CPT | Performed by: OBSTETRICS & GYNECOLOGY

## 2019-05-15 PROCEDURE — 84450 TRANSFERASE (AST) (SGOT): CPT | Performed by: OBSTETRICS & GYNECOLOGY

## 2019-05-15 RX ORDER — LABETALOL HYDROCHLORIDE 5 MG/ML
20 INJECTION, SOLUTION INTRAVENOUS ONCE
Status: COMPLETED | OUTPATIENT
Start: 2019-05-16 | End: 2019-05-16

## 2019-05-15 RX ORDER — LABETALOL 100 MG/1
100 TABLET, FILM COATED ORAL EVERY 12 HOURS SCHEDULED
Status: DISCONTINUED | OUTPATIENT
Start: 2019-05-15 | End: 2019-05-15

## 2019-05-15 RX ORDER — LABETALOL 100 MG/1
200 TABLET, FILM COATED ORAL EVERY 12 HOURS SCHEDULED
Status: DISCONTINUED | OUTPATIENT
Start: 2019-05-15 | End: 2019-05-17 | Stop reason: HOSPADM

## 2019-05-15 RX ORDER — LABETALOL 100 MG/1
100 TABLET, FILM COATED ORAL ONCE
Status: COMPLETED | OUTPATIENT
Start: 2019-05-15 | End: 2019-05-15

## 2019-05-15 RX ADMIN — LABETALOL HCL 100 MG: 100 TABLET, FILM COATED ORAL at 12:38

## 2019-05-15 RX ADMIN — LABETALOL HYDROCHLORIDE 20 MG: 5 INJECTION INTRAVENOUS at 23:59

## 2019-05-15 RX ADMIN — LABETALOL HYDROCHLORIDE 10 MG: 5 INJECTION INTRAVENOUS at 20:55

## 2019-05-15 RX ADMIN — IBUPROFEN 800 MG: 800 TABLET ORAL at 20:09

## 2019-05-15 RX ADMIN — LABETALOL HYDROCHLORIDE 10 MG: 5 INJECTION INTRAVENOUS at 22:13

## 2019-05-15 RX ADMIN — LABETALOL HCL 100 MG: 100 TABLET, FILM COATED ORAL at 07:25

## 2019-05-15 RX ADMIN — IBUPROFEN 800 MG: 800 TABLET ORAL at 14:16

## 2019-05-15 RX ADMIN — LABETALOL HYDROCHLORIDE 10 MG: 5 INJECTION INTRAVENOUS at 06:49

## 2019-05-15 RX ADMIN — LABETALOL HCL 200 MG: 100 TABLET, FILM COATED ORAL at 20:10

## 2019-05-15 NOTE — PROGRESS NOTES
[facility]    Post-Partum Progress Note    Assessment & Plan   Assessment:  Post-partum day #2  Normal spontaneous vaginal delivery  Severe Preeclampsia    Doing well.  No excessive bleeding  Pain well-controlled.  Tolerating physical therapy and rehabilitation well.  Denies any neurological signs or symptoms  Bottle feeding  BP is elevated requiring Labetolol.    Plan:  Reportable signs and symptoms dicussed with the patient  Anticipate discharge in 2 days  [place] Labetolol    Vineet Conner     Interval History   Doing well.  Pain is well-controlled.  No fevers.  No history of foul-smelling vaginal discharge.  Good appetite.  Denies chest pain, shortness of breath, nausea or vomiting.  Denies any neurological signs or symptoms. Vaginal bleeding is similar to a heavy menstrual flow.  Ambulatory.  Bottle feeding.    Medications     - MEDICATION INSTRUCTIONS -       - MEDICATION INSTRUCTIONS -       NO Rho (D) immune globulin (RhoGam) needed - mother Rh POSITIVE       - MEDICATION INSTRUCTIONS -       - MEDICATION INSTRUCTIONS -       oxytocin in 0.9% NaCl         labetalol  100 mg Oral Q12H DENNY     senna-docusate  1 tablet Oral BID    Or     senna-docusate  2 tablet Oral BID     sodium chloride (PF)  3 mL Intracatheter Q8H       Physical Exam   Temp: 99.4  F (37.4  C) Temp src: Oral BP: (!) 155/101 Pulse: 74 Heart Rate: 74 Resp: 16 SpO2: 98 %      Vitals:    05/12/19 0954   Weight: 61.7 kg (136 lb)     Vital Signs with Ranges  Temp:  [97.9  F (36.6  C)-99.4  F (37.4  C)] 99.4  F (37.4  C)  Pulse:  [74-80] 74  Heart Rate:  [74-80] 74  Resp:  [16-18] 16  BP: (137-168)/() 155/101  SpO2:  [95 %-98 %] 98 %  I/O last 3 completed shifts:  In: -   Out: 2575 [Urine:2575]    Uterine fundus is firm, non-tender and at the level of the umbilicus  Extremities Non-tender  Heart is regular rate and rhythm and lungs clear to auscultation  Reflexes are 1-2+, equal, symmetric, without clonus  Urine output is  improving and clear.  Neuro exam is normal.  Liver function is coming down    Data   Recent Labs   Lab Test 05/12/19  1025   ABO O   RH Pos   AS Neg     Recent Labs   Lab Test 05/14/19  0555 05/12/19  1503   HGB 14.0 15.2     No lab results found.

## 2019-05-15 NOTE — PLAN OF CARE
Face to face report given with opportunity to observe patient.    Report given to Aurora SIMPSON   5/15/2019  7:32 AM

## 2019-05-15 NOTE — PLAN OF CARE
Patient slept through the night.  Patient requested baby be in nursery for the night so she could sleep.  Encouraged rooming in, however Mom feels since her baby is bottle feeding to let staff take care of him.

## 2019-05-15 NOTE — PROVIDER NOTIFICATION
05/15/19 1220   Provider Notification   Provider Name/Title Dr. Conner   Method of Notification Phone   Request Evaluate-Remote   Notification Reason Vital Signs Change   MD updated about pt increased blood pressure. Order for 100 mg Labetalol PO STAT with a changed evening dose of 100mg to 200 mg.Then Labetalol 200mg PO BID. Recheck BP in 45-60 min. And may administer IV medication as ordered per protocol.

## 2019-05-15 NOTE — PLAN OF CARE
Face to face report given with opportunity to observe patient.  Report given to JESSICA Hernandez.    Aurora Devlin  5/15/2019, 3:05 PM

## 2019-05-15 NOTE — PLAN OF CARE
Assessments completed as charted. B/P: 148/80, T: 99.4, P: 74, R: 16. Rates pain: 0/10 . Voiding without difficulty. Fundus: Midline . Lochia: Light. Activity: unrestricted with out pain . Infant feeding: Formula.   Will continue to monitor BP through night.  Patient on nefedipine XL 60mg daily.      Postpartum care education provided, see Patient Education activity. Patient denies needs. Will monitor.  JAZZY SIMPSON

## 2019-05-15 NOTE — PLAN OF CARE
Administered PO Labetalol 100 mg per MD order STAT. Will monitor BP every 15 min. Encouraged pt to rest on left side in quiet dark room. Denies HA, epigastric pain and/or visual changes. Reflexes +3 on right and +2 on left. No clonus noted and +3 pitting edema present in BLE. Will continue to monitor.

## 2019-05-15 NOTE — PLAN OF CARE
Assessments completed as charted. B/P: 152/107, T: 98.6, P: 64, R: 16. Rates pain: as cramping, states ibuprofen did help. Voiding without difficulty. Fundus: Midline firm U/1. Lochia: Light. Activity: unrestricted with out pain . Infant feeding: Formula.     Patient asked to have infant brought out of room after feeding, education on importance of rooming in, learning baby's cues, synchronizing sleep patterns and infant care.  She agreed to keep baby in the room and rest as baby is resting at this time.     Postpartum care education provided, see Patient Education activity. Patient denies needs. Will monitor.  Gabi Sanchez

## 2019-05-15 NOTE — PLAN OF CARE
Up to bathroom to void for first time since catheter removal. Voided large amount and tolerated activity well.

## 2019-05-15 NOTE — PLAN OF CARE
Removed IV from patient's left hand.  Cath tip intact. Site clean and dry.  Patient still has IV in Right hand.

## 2019-05-15 NOTE — PLAN OF CARE
Blood pressure q 15 min follow administration of IV and PO Labetalol. Reflexes +2 on left and +3 on right. No clonus noted. Bilateral +3 pitting edema noted to BLE. Denies HA, epigastric pain, and/or visual changes. Fundus firm U/1, lochia scant with no large clots noted per pt. Perineum intact with s/s of infection present, encouraged to use ice, jose c bottle, tub soaks and Tucks as needed for discomfort.  Mom up independently in room. States pain 0 (No pain). Encouraged rooming and mom insisted babe stay in nursery so that she could sleep - d/t inability to sleep during the night. Denies any needs or concerns at this time.

## 2019-05-16 ENCOUNTER — HOSPITAL ENCOUNTER (INPATIENT)
Dept: CARDIOLOGY | Facility: HOSPITAL | Age: 19
End: 2019-05-16
Attending: INTERNAL MEDICINE
Payer: COMMERCIAL

## 2019-05-16 ENCOUNTER — APPOINTMENT (OUTPATIENT)
Dept: CT IMAGING | Facility: HOSPITAL | Age: 19
End: 2019-05-16
Attending: OBSTETRICS & GYNECOLOGY
Payer: COMMERCIAL

## 2019-05-16 PROBLEM — D62 ANEMIA DUE TO BLOOD LOSS, ACUTE: Status: ACTIVE | Noted: 2019-05-16

## 2019-05-16 PROBLEM — O14.13 PREECLAMPSIA, SEVERE, THIRD TRIMESTER: Status: ACTIVE | Noted: 2019-05-16

## 2019-05-16 LAB
ALBUMIN SERPL-MCNC: 1.5 G/DL (ref 3.4–5)
ALP SERPL-CCNC: 248 U/L (ref 40–150)
ALT SERPL W P-5'-P-CCNC: 78 U/L (ref 0–50)
ANION GAP SERPL CALCULATED.3IONS-SCNC: 8 MMOL/L (ref 3–14)
AST SERPL W P-5'-P-CCNC: 43 U/L (ref 0–45)
BILIRUB SERPL-MCNC: 0.2 MG/DL (ref 0.2–1.3)
BUN SERPL-MCNC: 9 MG/DL (ref 7–30)
CA-I SERPL ISE-MCNC: 4.5 MG/DL (ref 4.4–5.2)
CALCIUM SERPL-MCNC: 7.8 MG/DL (ref 8.5–10.1)
CHLORIDE SERPL-SCNC: 113 MMOL/L (ref 94–109)
CO2 SERPL-SCNC: 22 MMOL/L (ref 20–32)
CREAT SERPL-MCNC: 0.62 MG/DL (ref 0.52–1.04)
CREAT UR-MCNC: 82 MG/DL
ERYTHROCYTE [DISTWIDTH] IN BLOOD BY AUTOMATED COUNT: 13.4 % (ref 10–15)
GFR SERPL CREATININE-BSD FRML MDRD: >90 ML/MIN/{1.73_M2}
GLUCOSE SERPL-MCNC: 82 MG/DL (ref 70–99)
HCT VFR BLD AUTO: 34.4 % (ref 35–47)
HGB BLD-MCNC: 11.9 G/DL (ref 11.7–15.7)
MAGNESIUM SERPL-MCNC: 1.8 MG/DL (ref 1.6–2.3)
MCH RBC QN AUTO: 30.7 PG (ref 26.5–33)
MCHC RBC AUTO-ENTMCNC: 34.6 G/DL (ref 31.5–36.5)
MCV RBC AUTO: 89 FL (ref 78–100)
PLATELET # BLD AUTO: 338 10E9/L (ref 150–450)
POTASSIUM SERPL-SCNC: 4.1 MMOL/L (ref 3.4–5.3)
PROT SERPL-MCNC: 5.1 G/DL (ref 6.8–8.8)
RBC # BLD AUTO: 3.88 10E12/L (ref 3.8–5.2)
SODIUM SERPL-SCNC: 143 MMOL/L (ref 133–144)
WBC # BLD AUTO: 11.6 10E9/L (ref 4–11)

## 2019-05-16 PROCEDURE — 82330 ASSAY OF CALCIUM: CPT | Performed by: INTERNAL MEDICINE

## 2019-05-16 PROCEDURE — 25000132 ZZH RX MED GY IP 250 OP 250 PS 637: Performed by: OBSTETRICS & GYNECOLOGY

## 2019-05-16 PROCEDURE — 80053 COMPREHEN METABOLIC PANEL: CPT | Performed by: INTERNAL MEDICINE

## 2019-05-16 PROCEDURE — 83735 ASSAY OF MAGNESIUM: CPT | Performed by: INTERNAL MEDICINE

## 2019-05-16 PROCEDURE — 85027 COMPLETE CBC AUTOMATED: CPT | Performed by: INTERNAL MEDICINE

## 2019-05-16 PROCEDURE — 36415 COLL VENOUS BLD VENIPUNCTURE: CPT | Performed by: INTERNAL MEDICINE

## 2019-05-16 PROCEDURE — 25000128 H RX IP 250 OP 636: Performed by: INTERNAL MEDICINE

## 2019-05-16 PROCEDURE — 70450 CT HEAD/BRAIN W/O DYE: CPT | Mod: TC

## 2019-05-16 PROCEDURE — 93005 ELECTROCARDIOGRAM TRACING: CPT

## 2019-05-16 PROCEDURE — 93306 TTE W/DOPPLER COMPLETE: CPT | Mod: TC

## 2019-05-16 PROCEDURE — 25000132 ZZH RX MED GY IP 250 OP 250 PS 637: Performed by: INTERNAL MEDICINE

## 2019-05-16 PROCEDURE — 93010 ELECTROCARDIOGRAM REPORT: CPT | Performed by: INTERNAL MEDICINE

## 2019-05-16 PROCEDURE — 99252 IP/OBS CONSLTJ NEW/EST SF 35: CPT | Performed by: INTERNAL MEDICINE

## 2019-05-16 PROCEDURE — 93306 TTE W/DOPPLER COMPLETE: CPT | Mod: 26 | Performed by: INTERNAL MEDICINE

## 2019-05-16 PROCEDURE — 12000000 ZZH R&B MED SURG/OB

## 2019-05-16 RX ORDER — HYDRALAZINE HYDROCHLORIDE 10 MG/1
10 TABLET, FILM COATED ORAL 4 TIMES DAILY PRN
Status: DISCONTINUED | OUTPATIENT
Start: 2019-05-16 | End: 2019-05-16

## 2019-05-16 RX ORDER — HYDRALAZINE HYDROCHLORIDE 10 MG/1
TABLET, FILM COATED ORAL
Status: DISCONTINUED
Start: 2019-05-16 | End: 2019-05-16 | Stop reason: HOSPADM

## 2019-05-16 RX ORDER — HYDRALAZINE HYDROCHLORIDE 20 MG/ML
10 INJECTION INTRAMUSCULAR; INTRAVENOUS ONCE
Status: COMPLETED | OUTPATIENT
Start: 2019-05-16 | End: 2019-05-16

## 2019-05-16 RX ORDER — HYDRALAZINE HYDROCHLORIDE 20 MG/ML
10-20 INJECTION INTRAMUSCULAR; INTRAVENOUS EVERY 4 HOURS PRN
Status: DISCONTINUED | OUTPATIENT
Start: 2019-05-16 | End: 2019-05-17 | Stop reason: HOSPADM

## 2019-05-16 RX ADMIN — HYDRALAZINE HYDROCHLORIDE 10 MG: 20 INJECTION INTRAMUSCULAR; INTRAVENOUS at 17:52

## 2019-05-16 RX ADMIN — LABETALOL HCL 200 MG: 100 TABLET, FILM COATED ORAL at 19:34

## 2019-05-16 RX ADMIN — HYDRALAZINE HYDROCHLORIDE 10 MG: 10 TABLET, FILM COATED ORAL at 04:04

## 2019-05-16 RX ADMIN — HYDRALAZINE HYDROCHLORIDE 10 MG: 20 INJECTION INTRAMUSCULAR; INTRAVENOUS at 01:05

## 2019-05-16 RX ADMIN — HYDRALAZINE HYDROCHLORIDE 10 MG: 20 INJECTION INTRAMUSCULAR; INTRAVENOUS at 05:24

## 2019-05-16 RX ADMIN — LABETALOL HCL 200 MG: 100 TABLET, FILM COATED ORAL at 07:50

## 2019-05-16 NOTE — PLAN OF CARE
Assessments completed as charted. B/P: 148/82[L arm Manual [, T: 97.7, P: 60, R: 16. Rates pain: 0/10 . Voiding without difficulty. Fundus: Midline U/3. Lochia: Light. Activity: unrestricted with out pain . Infant feeding: Formula.      Patient Education Activity.  Items included in the education are:     proper positioning and latch    effectiveness of feeding    manual expression    handling and storing breastmilk    sign/symptoms of infant feeding issues requiring referral to qualified health care provider  Postpartum care education provided, see Patient Education activity. Patient denies needs. Will monitor.    BP being monitored closely, meds ordered- see MAR. Dr. Jones following at this time.     Jennifer Cruz

## 2019-05-16 NOTE — CONSULTS
[facility]  Consult Note - Hospitalist Service     Date of Admission:  2019  Consult Requested by: Ob/gyn physician  Reason for Consult: Uncontrolled BP    Assessment & Plan 2019 1:50 AM  Shirley Daniels is a 18 year old female admitted on 2019. She is Post partum day 3. Diagnosed with Pre-eclampsia, severe on admission. Treated with magnesium sulfate and labetalol. Now hypermagnesemic, still hypertensive. Liver enzymes improving. Magnesium level (very high), trending down. Hemodynamically stable.   Seen in the room. Examined. Critical points of PMH reviewed and current events discussed with  nursing staff.   EKG and labs ordered. Stable patient. Order to give 10 mg hydralazine given. BP improved. No need to transfer to ICU and hypotensive medication infusion.   Will sign to day time hospitalist.     The patient's care was discussed with the Bedside Nurse.    Mary Jones MD  [facility]    ______________________________________________________________________    Chief Complaint   None    Reason for consult:  to help manage high blood pressure in post partum women with significant lab abnormalities, bradycardia.      History is obtained from the patient, electronic health record and patient's nurse. PCP ERICA Nur MD    History of Present Illness   Shirley Daniels is a 18 year old women with PMH of , fragmented prenatal care, gestation DM, severe pre-eclampsia who is post-partum day 3 and now hypertensive, bradycaric with severe lab abnormalities. She was initially treated with amlodipine, but later, switched to labetalol. She received several doses of labetalol today (staring 7AM: 10 mg IV 6:49, 10 mg 9 PM, 10 mg 10PM, 20 mg 11:20 PM) and her blood pressure (confirmed manually) 182 /90 R and 180/92 left. Oral dose of 200 mg labetalol given 8 PM. At this point, hospitalist was consulted to help control her BP. Patient denies shortness of breath, chest pain, diplopia,  "headache or back pain. Urinating w/o problems. Patient was seen in the room, examined. EKG, stat labs including magnesium was ordered.    Order was given 10 mg IV hydralazine (she is already bradycardic and we cannot give more labetalol). BP much improved, and most recent (1:30 AM) is 144/78.   Patient is stable, BP improving, and I will not transfer her to ICU for a hydralazine drip. Will manage on Leyla/gyn with oral meds.     Review of Systems   The 10 point Review of Systems is negative other than noted in the HP.     Past Medical History    I have reviewed this patient's medical history and updated it with pertinent information if needed.   No past medical history on file.    Past Surgical History   I have reviewed this patient's surgical history and updated it with pertinent information if needed.  Past Surgical History:   Procedure Laterality Date     HC REMOVE TONSILS/ADENOIDS,<11 Y/O       SURGICAL PATHOLOGY EXAM      removal of papiloma       Social History   I have reviewed this patient's social history and updated it with pertinent information if needed.  Social History     Tobacco Use     Smoking status: Never Smoker     Smokeless tobacco: Never Used   Substance Use Topics     Alcohol use: No     Comment: last use month ago     Drug use: Yes     Comment: marijuana use today, uses \"not that often\"       Family History   I have reviewed this patient's family history and updated it with pertinent information if needed.   Family History   Problem Relation Age of Onset     Unknown/Adopted No family hx of      Asthma No family hx of      C.A.D. No family hx of      Diabetes No family hx of      Cerebrovascular Disease No family hx of      Hypertension No family hx of      Hypertension No family hx of      Cerebrovascular Disease No family hx of        Medications   Current Facility-Administered Medications   Medication     acetaminophen (TYLENOL) tablet 650 mg     calcium gluconate 10 % injection 1 g     " "carboprost (HEMABATE) injection 250 mcg     diphenhydrAMINE (BENADRYL) capsule 25 mg    Or     diphenhydrAMINE (BENADRYL) injection 25 mg     ibuprofen (ADVIL/MOTRIN) tablet 800 mg     labetalol (NORMODYNE) tablet 200 mg     labetalol (NORMODYNE/TRANDATE) injection 10 mg     lactated ringers BOLUS 1,000 mL     LORazepam (ATIVAN) injection 2 mg     magnesium sulfate 2 g in water intermittent infusion     magnesium sulfate 4 g in 100 mL sterile water (premade)     magnesium sulfate injection 4 g     Medication Instructions: misoprostol (CYTOTEC)- Nurse to discuss ordering with provider, if needed. Ordered via \"OB misoprostol (CYTOTEC) Postpartum Hemorrhage PANEL\"     No MMR Needed - Assessment: Patient does not need MMR vaccine     NO Rho (D) immune globulin (RhoGam) needed - mother Rh POSITIVE     No Tdap Needed - Assessment: Patient does not need Tdap vaccine     ondansetron (ZOFRAN) injection 4 mg     Opioid plan postpartum - medication instruction     oxyCODONE (ROXICODONE) tablet 5 mg     oxytocin (PITOCIN) 30 units in 500 mL 0.9% NaCl infusion     oxytocin (PITOCIN) injection 10 Units     senna-docusate (SENOKOT-S/PERICOLACE) 8.6-50 MG per tablet 1 tablet    Or     senna-docusate (SENOKOT-S/PERICOLACE) 8.6-50 MG per tablet 2 tablet     sodium chloride (PF) 0.9% PF flush 3 mL     sodium chloride (PF) 0.9% PF flush 3 mL     sodium phosphate (FLEET ENEMA) 1 enema     tranexamic acid (CYKLOKAPRON) 1 g in sodium chloride 0.9 % 50 mL bolus       Allergies   No Known Allergies    Physical Exam   Vital Signs: Temp: 97.7  F (36.5  C) Temp src: Oral BP: (!) 194/114 Pulse: 58 Heart Rate: 75 Resp: 16 SpO2: 98 %      Weight: 136 lbs 0 oz    General Appearance: pt is a young women in no distress. Non-toxic looking.   Eyes: WANDA, EOMI  HEENT: OP clear.   Respiratory: symmetrical chest expansion, good air entry, vesicular BS bilaterally  Cardiovascular: Regular, bradycardic, S2 accentuated P2 (pulmonary auscultation area. No " m/g/r.   GI: soft, not tender, no HSM, no pulsatile masses.   Lymph/Hematologic: no LAD   Genitourinary: no CVA tenderness.   Skin: pale  Musculoskeletal: no fasciculations, no sarcopenia  Neurologic: DTR equal 2/4, no myoclonus. Muscle tone equal.   Psychiatric: Awake, alert, pleasant, cooperative, normal thought process and content.     Data   Results for orders placed or performed during the hospital encounter of 05/12/19 (from the past 24 hour(s))   AST   Result Value Ref Range    AST 48 (H) 0 - 45 U/L   Comprehensive metabolic panel   Result Value Ref Range    Sodium 143 133 - 144 mmol/L    Potassium 4.1 3.4 - 5.3 mmol/L    Chloride 113 (H) 94 - 109 mmol/L    Carbon Dioxide 22 20 - 32 mmol/L    Anion Gap 8 3 - 14 mmol/L    Glucose 82 70 - 99 mg/dL    Urea Nitrogen 9 7 - 30 mg/dL    Creatinine 0.62 0.52 - 1.04 mg/dL    GFR Estimate >90 >60 mL/min/[1.73_m2]    GFR Estimate If Black >90 >60 mL/min/[1.73_m2]    Calcium 7.8 (L) 8.5 - 10.1 mg/dL    Bilirubin Total 0.2 0.2 - 1.3 mg/dL    Albumin 1.5 (L) 3.4 - 5.0 g/dL    Protein Total 5.1 (L) 6.8 - 8.8 g/dL    Alkaline Phosphatase 248 (H) 40 - 150 U/L    ALT 78 (H) 0 - 50 U/L    AST 43 0 - 45 U/L   CBC with platelets   Result Value Ref Range    WBC 11.6 (H) 4.0 - 11.0 10e9/L    RBC Count 3.88 3.8 - 5.2 10e12/L    Hemoglobin 11.9 11.7 - 15.7 g/dL    Hematocrit 34.4 (L) 35.0 - 47.0 %    MCV 89 78 - 100 fl    MCH 30.7 26.5 - 33.0 pg    MCHC 34.6 31.5 - 36.5 g/dL    RDW 13.4 10.0 - 15.0 %    Platelet Count 338 150 - 450 10e9/L   Magnesium   Result Value Ref Range    Magnesium 1.8 1.6 - 2.3 mg/dL

## 2019-05-16 NOTE — PLAN OF CARE
Assessments completed as charted. B/P: 148/98[manual righ arm [, T: 98.9, P: 60, R: 16. Rates pain: 0/10. Voiding without difficulty. Fundus: Midline, firm. Lochia: None. Activity: unrestricted with out pain  and normal activity. Infant feeding: Formula. See flowsheets for assessment data.     Postpartum breastfeeding assessment completed and education provided, see Patient Education Activity.  Items included in the education are:   proper positioning and latch  effectiveness of feeding  manual expression  handling and storing breastmilk  maintenance of breastfeeding for the first 6 months  sign/symptoms of infant feeding issues requiring referral to qualified health care provider  Postpartum care education provided, see Patient Education activity. Patient denies needs. Will monitor.  Codi Bedolla

## 2019-05-16 NOTE — PLAN OF CARE
Face to face report given with opportunity to observe patient.    Report given to Codi Cruz   5/16/2019  7:31 AM

## 2019-05-16 NOTE — PROGRESS NOTES
[facility]    Post-Partum Progress Note    Assessment & Plan   Assessment:  Severe Preeclampsia  Severe PP Hypertension necessitating dual therapy, work-up pending  Anemia due to acute blood loss due to vaginal delivery, NOT complicated  Normal spontaneous vaginal delivery    No excessive bleeding  Pain well-controlled.  Tolerating physical therapy and rehabilitation well.  HTN poorly controlled, CARDIAC ECHO normal, EKG abnormal P waves, BRAIN CT pending  Preeclampsia lab improving     Plan:  Breast feeding strategies discussed  Pain control measures as needed  Reportable signs and symptoms dicussed with the patient  Hospitalist consulted for HTN--unrelenting and needing dual therapy    Vineet Conner     Interval History   Doing well.  Pain is adequately controlled.  No fevers.  No history of foul-smelling vaginal discharge.  Good appetite.  Denies chest pain, shortness of breath, nausea or vomiting.  Vaginal bleeding is similar to a heavy menstrual flow.  Breastfeeding OK.  Denies any neurological signs or symptoms, epigastric pain,  symptoms.    Medications     - MEDICATION INSTRUCTIONS -       - MEDICATION INSTRUCTIONS -       NO Rho (D) immune globulin (RhoGam) needed - mother Rh POSITIVE       - MEDICATION INSTRUCTIONS -       - MEDICATION INSTRUCTIONS -       oxytocin in 0.9% NaCl         hydrALAZINE         labetalol  200 mg Oral Q12H DENNY     senna-docusate  1 tablet Oral BID    Or     senna-docusate  2 tablet Oral BID     sodium chloride (PF)  3 mL Intracatheter Q8H       Physical Exam   Temp: 97.7  F (36.5  C) Temp src: Oral BP: 158/72(manual L arm ) Pulse: 60 Heart Rate: 75 Resp: 16 SpO2: 98 %      Vitals:    05/12/19 0954   Weight: 61.7 kg (136 lb)     Vital Signs with Ranges  Temp:  [97.7  F (36.5  C)-98.9  F (37.2  C)] 97.7  F (36.5  C)  Pulse:  [58-69] 60  Heart Rate:  [61-75] 75  Resp:  [16] 16  BP: (143-194)/() 158/72  SpO2:  [96 %-98 %] 98 %  No intake/output data  recorded.    Uterine fundus is firm, non-tender and at the level of the umbilicus  Extremities Non-tender  Heart is regular rate and rhythm and lungs clear to auscultation  Reflexes are 1-2+, equal, symmetric, without clonus  Urine output is good, diuresing well, clear    Data   Recent Labs   Lab Test 05/12/19  1025   ABO O   RH Pos   AS Neg     Recent Labs   Lab Test 05/16/19  0103 05/14/19  0555   HGB 11.9 14.0     No lab results found.

## 2019-05-17 VITALS
OXYGEN SATURATION: 97 % | BODY MASS INDEX: 25.03 KG/M2 | HEART RATE: 84 BPM | SYSTOLIC BLOOD PRESSURE: 125 MMHG | DIASTOLIC BLOOD PRESSURE: 74 MMHG | HEIGHT: 62 IN | WEIGHT: 136 LBS | RESPIRATION RATE: 16 BRPM | TEMPERATURE: 98 F

## 2019-05-17 LAB
ANION GAP SERPL CALCULATED.3IONS-SCNC: 5 MMOL/L (ref 3–14)
CHLORIDE SERPL-SCNC: 112 MMOL/L (ref 94–109)
CO2 SERPL-SCNC: 22 MMOL/L (ref 20–32)
POTASSIUM SERPL-SCNC: 4.3 MMOL/L (ref 3.4–5.3)
SODIUM SERPL-SCNC: 139 MMOL/L (ref 133–144)

## 2019-05-17 PROCEDURE — 80051 ELECTROLYTE PANEL: CPT | Performed by: INTERNAL MEDICINE

## 2019-05-17 PROCEDURE — 25000132 ZZH RX MED GY IP 250 OP 250 PS 637: Performed by: OBSTETRICS & GYNECOLOGY

## 2019-05-17 PROCEDURE — 36415 COLL VENOUS BLD VENIPUNCTURE: CPT | Performed by: INTERNAL MEDICINE

## 2019-05-17 RX ORDER — HYDRALAZINE HYDROCHLORIDE 25 MG/1
25 TABLET, FILM COATED ORAL 3 TIMES DAILY
Qty: 50 TABLET | Refills: 1 | Status: ON HOLD | OUTPATIENT
Start: 2019-05-17 | End: 2020-05-09

## 2019-05-17 RX ORDER — LABETALOL 200 MG/1
200 TABLET, FILM COATED ORAL 2 TIMES DAILY
Qty: 60 TABLET | Refills: 1 | Status: SHIPPED | OUTPATIENT
Start: 2019-05-17 | End: 2019-09-13

## 2019-05-17 RX ORDER — IBUPROFEN 800 MG/1
800 TABLET, FILM COATED ORAL EVERY 8 HOURS PRN
Qty: 40 TABLET | Refills: 1 | Status: ON HOLD | OUTPATIENT
Start: 2019-05-17 | End: 2020-05-09

## 2019-05-17 RX ADMIN — LABETALOL HCL 200 MG: 100 TABLET, FILM COATED ORAL at 06:47

## 2019-05-17 NOTE — PROGRESS NOTES
Message left for Nilsa [facility] CPS worker Paula [place] updating her of the plan for discharge today.

## 2019-05-17 NOTE — PLAN OF CARE
Assessments completed as charted. B/P: 123/74, T: 98, P: 84, R: 16. Rates pain: 0/10 . Voiding without difficulty. Fundus: Midline . Lochia: Light. Activity: unrestricted with out pain . Infant feeding: Formula.  Pt denies headache, blurr vision or tinnitus.  Encouraged to drink fluids  Postpartum care education provided, see Patient Education activity. Patient denies needs. Will monitor.  Yumiko Avila

## 2019-05-17 NOTE — PLAN OF CARE
Pts vitals have been stable.  Denies any pain, headache or dizzynes.  Got up and showered and is getting ready to go home.

## 2019-05-17 NOTE — PLAN OF CARE
Pt BP's trending up following 1522 assessment. Dr. Santillan, OB on call consulted as well as Dr. Alva medical hospitalist. PRN IV hydralazine 10mg given per order. BP's assessed. Pt up in tub 1830.

## 2019-05-17 NOTE — DISCHARGE SUMMARY
[facility]    Discharge Summary  Obstetrics    Date of Admission:  2019  Date of Discharge:  2019  Discharging Provider: Vineet Conner    Discharge Diagnoses   Intrauterine pregnancy at 38+ weeks gestation  Severe Preeclampsia  NVD  Severe PP HTN  Anemia due to acute blood loss of vaginal delivery, NOT complicated    History of Present Illness   Shirley Daniels is a 18 year old female who presented with signs and symptoms of Severe Preeclampsia at 38+ weeks gestation.    Hospital Course   The patient's hospital course was complicated by the severe preeclampsia.  She recovered very slowly with severe PP HTN requiring hospitalist consult, dual therapy for her HTN with Labetolol and Apresoline.  Imaging included a normal Cardiac ECHO and normal Brain CT.  On discharge, her pain was well controlled. Vaginal bleeding is similar to peak menstrual flow.  Voiding without difficulty.  Ambulating well and tolerating a normal diet.  No fevers.  Blood pressure stabilized and the infant was stable.  She was discharged on post-partum day # 5.    Post-partum hemoglobin:   Hemoglobin   Date Value Ref Range Status   2019 11.9 11.7 - 15.7 g/dL Final       Vineet Conner    Discharge Disposition   Discharged to home   Condition at discharge: Stable    Primary Care Physician   Stefan Nur MD    Consultations This Hospital Stay   SOCIAL WORK IP CONSULT  ANESTHESIOLOGY IP CONSULT  HOME CARE POST PARTUM/ IP CONSULT  LACTATION IP CONSULT    Discharge Orders      Activity    Review discharge instructions     Reason for your hospital stay    Maternity care     Discharge Instructions - Postpartum visit    Schedule postpartum visit with Dr. Nur for BP check along with your baby and return to clinic in 1 week.     Diet    Resume previous diet     Discharge Medications   Current Discharge Medication List      START taking these medications    Details   hydrALAZINE (APRESOLINE) 25 MG tablet Take 1  tablet (25 mg) by mouth 3 times daily Take if Systolic BP if above 155 or if Diastolic BP is above 95  Qty: 50 tablet, Refills: 1    Associated Diagnoses: Preeclampsia, severe, third trimester; Hypertension in pregnancy, preeclampsia, severe, postpartum condition      ibuprofen (ADVIL/MOTRIN) 800 MG tablet Take 1 tablet (800 mg) by mouth every 8 hours as needed for pain Take with a full meal for primary pain control  Qty: 40 tablet, Refills: 1    Associated Diagnoses:  (normal spontaneous vaginal delivery)      labetalol (NORMODYNE) 200 MG tablet Take 1 tablet (200 mg) by mouth 2 times daily Take at 7 AM and 7 PM  Qty: 60 tablet, Refills: 1    Associated Diagnoses: Preeclampsia, severe, third trimester; Hypertension in pregnancy, preeclampsia, severe, postpartum condition         CONTINUE these medications which have NOT CHANGED    Details   Prenatal Vit-Fe Fumarate-FA (PRENATAL VITAMIN PO) Take 1 tablet by mouth daily      albuterol (PROAIR HFA/PROVENTIL HFA/VENTOLIN HFA) 108 (90 Base) MCG/ACT inhaler Inhale 2 puffs into the lungs every 4 hours as needed for shortness of breath / dyspnea or wheezing  Qty: 1 Inhaler, Refills: 0           Allergies   No Known Allergies

## 2019-05-17 NOTE — PLAN OF CARE
Paula Reyes from CPS called and said pt will have services provided when at home, states pt has wic  And a plan is in place for home visits.

## 2019-05-17 NOTE — PLAN OF CARE
Face to face report given with opportunity to observe patient.    Report given to Jennifer Bedolla   5/16/2019  7:19 PM

## 2019-05-17 NOTE — PLAN OF CARE
Patient discharged at 1545 via wheel chair accompanied by significant other and staff. Prescriptions sent to patients preferred pharmacy. All belongings sent with patient.     Discharge instructions reviewed with pt, boyfriend and her mother.  Stressed to pt the importance of resting taking her meds as prescribed, seeing Dr. Nur on Tuesday and monitoring her BP a couple times a day at home. Pt knows to come to ER if her BP is > than 155/100 after taking the prn BP med Listed belongings gathered and returned to patient. Clothes and cell phone     [facility]     Core Measures and Vaccines  Core Measures applicable during stay: no  Pneumonia and Influenza given prior to discharge, if indicated: N/A    MISC  Follow up appointment made:  Yes  Home and hospital aquired medications returned to patient: N/A  Patient reports pain was well managed at discharge: Yes

## 2019-05-17 NOTE — PLAN OF CARE
Assessments completed as charted. B/P: 138/95, T: 99.2, P: 86, R: 16. Rates pain: 0/10 . Voiding without difficulty. Fundus: Midline u/3. Lochia: Light. Activity: unrestricted with out pain . Infant feeding: Formula feeding going well.      Postpartum breastfeeding assessment completed and education provided, see Patient Education Activity.  Items included in the education are:     proper positioning and latch    effectiveness of feeding    manual expression    handling and storing breastmilk    maintenance of breastfeeding for the first 6 months    sign/symptoms of infant feeding issues requiring referral to qualified health care provider  Postpartum care education provided, see Patient Education activity. Patient denies needs. Will monitor.  Jennifer Cruz

## 2019-05-17 NOTE — PROGRESS NOTES
[facility]    Post-Partum Progress Note    Assessment & Plan   Assessment:  Post-partum day #5  Normal spontaneous vaginal delivery  Severe Preeclampsia  Severe PP HTN  Anemia due to acute blood loss of vaginal delivery, NOT complicated    Doing well.  No excessive bleeding  Pain well-controlled.  Tolerating physical therapy and rehabilitation well.    Plan:  Ambulation encouraged  Breast feeding strategies discussed  Pain control measures as needed  Reportable signs and symptoms dicussed with the patient  Discharge later today    Vineet Conner     Interval History   Doing well.  Pain is well-controlled.  No fevers.  No history of foul-smelling vaginal discharge.  Good appetite.  Denies chest pain, shortness of breath, nausea or vomiting.  Vaginal bleeding is similar to a heavy menstrual flow.  Ambulatory.  Breastfeeding well.  Wanting to go home.  Denies any neurological signs or symptoms.    Medications     - MEDICATION INSTRUCTIONS -       - MEDICATION INSTRUCTIONS -       NO Rho (D) immune globulin (RhoGam) needed - mother Rh POSITIVE       - MEDICATION INSTRUCTIONS -       - MEDICATION INSTRUCTIONS -       oxytocin in 0.9% NaCl         labetalol  200 mg Oral Q12H DENNY     senna-docusate  1 tablet Oral BID    Or     senna-docusate  2 tablet Oral BID     sodium chloride (PF)  3 mL Intracatheter Q8H       Physical Exam   Temp: 99.2  F (37.3  C) Temp src: Oral BP: 140/82 Pulse: 86 Heart Rate: 80 Resp: 16 SpO2: 97 %      Vitals:    05/12/19 0954   Weight: 61.7 kg (136 lb)     Vital Signs with Ranges  Temp:  [98.9  F (37.2  C)-99.3  F (37.4  C)] 99.2  F (37.3  C)  Pulse:  [86] 86  Heart Rate:  [80-88] 80  Resp:  [16] 16  BP: (132-173)/() 140/82  SpO2:  [96 %-97 %] 97 %  No intake/output data recorded.    Uterine fundus is firm, non-tender and at the level of the umbilicus  Extremities Non-tender  Heart is regular rate and rhythm and lungs clear to auscultation  Reflexes are 1+, equal, symmetric,  without clonus  Neuro exam is normal  Urine output is good and clear    Data   Recent Labs   Lab Test 05/12/19  1025   ABO O   RH Pos   AS Neg     Recent Labs   Lab Test 05/16/19  0103 05/14/19  0555   HGB 11.9 14.0     No lab results found.

## 2019-05-22 LAB
CANNABINOIDS UR CFM-MCNC: 295 NG/ML
CARBOXYTHC/CREAT UR: 360 NG/MG{CREAT}

## 2019-09-13 ENCOUNTER — HOSPITAL ENCOUNTER (EMERGENCY)
Facility: HOSPITAL | Age: 19
End: 2019-09-13
Payer: MEDICAID

## 2019-09-13 ENCOUNTER — HOSPITAL ENCOUNTER (EMERGENCY)
Facility: HOSPITAL | Age: 19
Discharge: HOME | End: 2019-09-13
Attending: NURSE PRACTITIONER | Admitting: NURSE PRACTITIONER
Payer: COMMERCIAL

## 2019-09-13 VITALS — DIASTOLIC BLOOD PRESSURE: 102 MMHG | TEMPERATURE: 97.8 F | OXYGEN SATURATION: 98 % | SYSTOLIC BLOOD PRESSURE: 159 MMHG

## 2019-09-13 DIAGNOSIS — K08.89 PAIN, DENTAL: Primary | ICD-10-CM

## 2019-09-13 PROCEDURE — 96372 THER/PROPH/DIAG INJ SC/IM: CPT

## 2019-09-13 PROCEDURE — G0463 HOSPITAL OUTPT CLINIC VISIT: HCPCS | Mod: 25

## 2019-09-13 PROCEDURE — 25000128 H RX IP 250 OP 636: Performed by: NURSE PRACTITIONER

## 2019-09-13 PROCEDURE — 99213 OFFICE O/P EST LOW 20 MIN: CPT | Mod: Z6 | Performed by: NURSE PRACTITIONER

## 2019-09-13 RX ORDER — KETOROLAC TROMETHAMINE 30 MG/ML
60 INJECTION, SOLUTION INTRAMUSCULAR; INTRAVENOUS ONCE
Status: COMPLETED | OUTPATIENT
Start: 2019-09-13 | End: 2019-09-13

## 2019-09-13 RX ADMIN — KETOROLAC TROMETHAMINE 60 MG: 30 INJECTION, SOLUTION INTRAMUSCULAR; INTRAVENOUS at 21:49

## 2019-09-13 ASSESSMENT — ENCOUNTER SYMPTOMS
VOMITING: 0
SHORTNESS OF BREATH: 0
SORE THROAT: 0
FEVER: 0
TROUBLE SWALLOWING: 0
CHILLS: 0
NAUSEA: 0

## 2019-09-13 NOTE — ED AVS SNAPSHOT
[facility]  [address] [address]  Phone:  [phone]                                    Shirley Daniels   MRN: [id number]    Department:  [facility]   Date of Visit:  9/13/2019           After Visit Summary Signature Page    I have received my discharge instructions, and my questions have been answered. I have discussed any challenges I see with this plan with the nurse or doctor.    ..........................................................................................................................................  Patient/Patient Representative Signature      ..........................................................................................................................................  Patient Representative Print Name and Relationship to Patient    ..................................................               ................................................  Date                                   Time    ..........................................................................................................................................  Reviewed by Signature/Title    ...................................................              ..............................................  Date                                               Time          22EPIC Rev 08/18

## 2019-09-14 NOTE — ED PROVIDER NOTES
History     Chief Complaint   Patient presents with     Dental Pain     c/o dental pain     HPI  Shirley Daniels is a 18 year old female who presents with significant other for dental pain. She reports left lower tooth pain that started today and has progressively gotten worse. She is scheduled to get her tooth pulled on 19. She is eating and drinking okay. Denies fever, N/V, SOB. She has tried tylenol for pain. She last took 1000 mg around 1900.     Allergies:  No Known Allergies    Problem List:    Patient Active Problem List    Diagnosis Date Noted     Preeclampsia, severe, third trimester--Magnesium---2019     Priority: Medium     Hypertension in pregnancy, preeclampsia, severe, postpartum condition--ECHO normal, EKG abnormal P waves, CT, Labetolol-Hydralazine--5/15/2019 2019     Priority: Medium     Anemia due to blood loss, acute, due to Vaginal Delivery, NOT complicated--2019     Priority: Medium      (normal spontaneous vaginal delivery)--2019     Priority: Medium     Encounter for triage in pregnant patient 2019     Priority: Medium     Gestational diabetes mellitus (GDM) in third trimester 2019     Priority: Medium     History of methamphetamine abuse 2018     Priority: Medium     BRIAN (generalized anxiety disorder) 2018     Priority: Medium     Moderate single current episode of major depressive disorder (H) 2018     Priority: Medium     Contraceptive management 2015     Priority: Medium     Overview:   implanon 10/15       Ruptured ovarian cyst 02/10/2014     Priority: Medium     Ruptured ovarian cyst with hemoperitoneum and pelvic pain          Past Medical History:    Past Medical History:   Diagnosis Date     Anemia due to blood loss, acute, due to Vaginal Delivery, NOT complicated--2019       Past Surgical History:    Past Surgical History:   Procedure Laterality Date     HC REMOVE  "TONSILS/ADENOIDS,<13 Y/O       SURGICAL PATHOLOGY EXAM      removal of papiloma       Family History:    Family History   Problem Relation Age of Onset     Unknown/Adopted No family hx of      Asthma No family hx of      C.A.D. No family hx of      Diabetes No family hx of      Cerebrovascular Disease No family hx of      Hypertension No family hx of      Hypertension No family hx of      Cerebrovascular Disease No family hx of        Social History:  Marital Status:  Single [1]  Social History     Tobacco Use     Smoking status: Never Smoker     Smokeless tobacco: Never Used   Substance Use Topics     Alcohol use: No     Comment: last use month ago     Drug use: Yes     Comment: marijuana use today, uses \"not that often\"        Medications:      albuterol (PROAIR HFA/PROVENTIL HFA/VENTOLIN HFA) 108 (90 Base) MCG/ACT inhaler   amoxicillin-clavulanate (AUGMENTIN) 875-125 MG tablet   hydrALAZINE (APRESOLINE) 25 MG tablet   Prenatal Vit-Fe Fumarate-FA (PRENATAL VITAMIN PO)   ibuprofen (ADVIL/MOTRIN) 800 MG tablet         Review of Systems   Constitutional: Negative for chills and fever.   HENT: Positive for dental problem. Negative for ear pain, sore throat and trouble swallowing.    Respiratory: Negative for shortness of breath.    Cardiovascular: Negative for chest pain.   Gastrointestinal: Negative for nausea and vomiting.   All other systems reviewed and are negative.      Physical Exam   BP: (!) 159/102  Heart Rate: 76  Temp: 97.8  F (36.6  C)  Resp: (crying, non labored)  SpO2: 98 %      Physical Exam   Constitutional: She is oriented to person, place, and time. She appears well-developed. She appears distressed.   HENT:   Mouth/Throat: Oropharynx is clear and moist. No trismus in the jaw. Dental caries present.   Pain and tenderness to palpation on tooth #19. Erythema along gumline.   Neck: Normal range of motion.   Cardiovascular: Normal rate.   Pulmonary/Chest: Effort normal.   Neurological: She is alert and " oriented to person, place, and time.   Nursing note and vitals reviewed.      ED Course        Procedures               No results found for this or any previous visit (from the past 24 hour(s)).    Medications   ketorolac (TORADOL) injection 60 mg (60 mg Intramuscular Given 9/13/19 9077)       Assessments & Plan (with Medical Decision Making)     I have reviewed the nursing notes.    I have reviewed the findings, diagnosis, plan and need for follow up with the patient.  1) Dental pain:  Patient presents to urgent care for left lower tooth pain that started today and is gradually getting worse. She has been taking tylenol with minimal relief. Patient is scheduled for removal of affected tooth on 9/16/2019. Patient has dental caries to several other teeth. Tenderness to palpation of tooth #19. No trismus. Offered dental block, patient declined. Toradol IM given in urgent care with improvement in pain after 20 minutes of monitoring.   Will prescribe antibiotic. Advised patient to alternate tylenol with ibuprofen as per directions. Encouraged her to try applying ice to the affected side. Advised patient to keep scheduled appointment with dentist. She should return to emergency department for worsening or concerning symptoms. Patient verbalizes understanding and agreeable with plan of care.     Discharge Medication List as of 9/13/2019  9:52 PM      START taking these medications    Details   amoxicillin-clavulanate (AUGMENTIN) 875-125 MG tablet Take 1 tablet by mouth 2 times daily for 7 days, Disp-14 tablet, R-0, E-Prescribe             Final diagnoses:   Pain, dental     9/13/2019   [facility]     Mpofu, Prudence, CNP  09/13/19 4194

## 2019-09-14 NOTE — DISCHARGE INSTRUCTIONS
Take antibiotic as prescribed. Alternate tylenol and ibuprofen as per directions as needed for pain.   Try an ice pack to the affected side of your face.  Keep scheduled appointment with dentist on Monday.  Return to emergency department for worsening or concerning symptoms.

## 2019-09-14 NOTE — ED TRIAGE NOTES
Pt presents today with c/o dental pain to left lower. Tooth is getting pulled on Monday, tooth has a hole in it and cracked. 650 mg of tylenol at 1900. No other medications given. Has not happend before, pain is getting worse. Pt is crying in room.

## 2020-01-13 ENCOUNTER — HOSPITAL ENCOUNTER (OUTPATIENT)
Dept: ULTRASOUND IMAGING | Facility: HOSPITAL | Age: 20
Discharge: HOME | End: 2020-01-13
Attending: FAMILY MEDICINE | Admitting: FAMILY MEDICINE
Payer: COMMERCIAL

## 2020-01-13 DIAGNOSIS — Z34.80 PREGNANCY IN MULTIGRAVIDA: ICD-10-CM

## 2020-01-13 PROCEDURE — 76805 OB US >/= 14 WKS SNGL FETUS: CPT | Mod: TC

## 2020-01-21 ENCOUNTER — TRANSFERRED RECORDS (OUTPATIENT)
Dept: HEALTH INFORMATION MANAGEMENT | Facility: CLINIC | Age: 20
End: 2020-01-21

## 2020-01-31 ENCOUNTER — HOSPITAL ENCOUNTER (EMERGENCY)
Facility: HOSPITAL | Age: 20
Discharge: STILL ADMITTED OR ADMINISTRATIVE | End: 2020-01-31
Admitting: EMERGENCY MEDICINE
Payer: COMMERCIAL

## 2020-01-31 ENCOUNTER — HOSPITAL ENCOUNTER (OUTPATIENT)
Facility: HOSPITAL | Age: 20
End: 2020-01-31
Admitting: FAMILY MEDICINE
Payer: COMMERCIAL

## 2020-01-31 ENCOUNTER — HOSPITAL ENCOUNTER (OUTPATIENT)
Facility: HOSPITAL | Age: 20
Discharge: HOME | End: 2020-01-31
Attending: FAMILY MEDICINE | Admitting: FAMILY MEDICINE
Payer: COMMERCIAL

## 2020-01-31 VITALS
HEART RATE: 83 BPM | RESPIRATION RATE: 16 BRPM | DIASTOLIC BLOOD PRESSURE: 64 MMHG | OXYGEN SATURATION: 99 % | SYSTOLIC BLOOD PRESSURE: 108 MMHG | TEMPERATURE: 98 F

## 2020-01-31 PROBLEM — Z36.89 ENCOUNTER FOR TRIAGE IN PREGNANT PATIENT: Status: ACTIVE | Noted: 2019-05-12

## 2020-01-31 LAB
ALBUMIN UR-MCNC: NEGATIVE MG/DL
AMPHETAMINES UR QL: NOT DETECTED NG/ML
APPEARANCE UR: CLEAR
BACTERIA #/AREA URNS HPF: ABNORMAL /HPF
BARBITURATES UR QL SCN: NOT DETECTED NG/ML
BENZODIAZ UR QL SCN: NOT DETECTED NG/ML
BILIRUB UR QL STRIP: NEGATIVE
BUPRENORPHINE UR QL: NOT DETECTED NG/ML
CANNABINOIDS UR QL: ABNORMAL NG/ML
COCAINE UR QL SCN: NOT DETECTED NG/ML
COLOR UR AUTO: YELLOW
D-METHAMPHET UR QL: NOT DETECTED NG/ML
GLUCOSE UR STRIP-MCNC: NEGATIVE MG/DL
HGB UR QL STRIP: ABNORMAL
KETONES UR STRIP-MCNC: NEGATIVE MG/DL
LEUKOCYTE ESTERASE UR QL STRIP: NEGATIVE
METHADONE UR QL SCN: NOT DETECTED NG/ML
MUCOUS THREADS #/AREA URNS LPF: PRESENT /LPF
NITRATE UR QL: NEGATIVE
OPIATES UR QL SCN: NOT DETECTED NG/ML
OXYCODONE UR QL SCN: NOT DETECTED NG/ML
PCP UR QL SCN: NOT DETECTED NG/ML
PH UR STRIP: 5.5 PH (ref 4.7–8)
PROPOXYPH UR QL: NOT DETECTED NG/ML
RBC #/AREA URNS AUTO: 3 /HPF (ref 0–2)
SOURCE: ABNORMAL
SP GR UR STRIP: 1.03 (ref 1–1.03)
SQUAMOUS #/AREA URNS AUTO: 3 /HPF (ref 0–1)
TRICYCLICS UR QL SCN: NOT DETECTED NG/ML
UROBILINOGEN UR STRIP-MCNC: NORMAL MG/DL (ref 0–2)
WBC #/AREA URNS AUTO: 1 /HPF (ref 0–5)

## 2020-01-31 PROCEDURE — 80306 DRUG TEST PRSMV INSTRMNT: CPT | Performed by: FAMILY MEDICINE

## 2020-01-31 PROCEDURE — 81001 URINALYSIS AUTO W/SCOPE: CPT | Mod: 59 | Performed by: FAMILY MEDICINE

## 2020-01-31 PROCEDURE — 40000268 ZZH STATISTIC NO CHARGES

## 2020-01-31 NOTE — PLAN OF CARE
Patient presents from Urgent Care with C/O two small bright red bleeding spots on tissue when she wiped. Stated she and signficant other had sex prior to seeing the small amount of bleeding. Denies contraction pain or cramping. Will monitor per triage orders and call Dr. Peters.

## 2020-01-31 NOTE — DISCHARGE INSTRUCTIONS

## 2020-01-31 NOTE — PLAN OF CARE
Diana Daniels is a 19 year old  and Unknown patient came in complaining of Vaginal Bleeding (Patient noted two small spots of blood when she wiped. Denies pain or contractions)    Patient Active Problem List   Diagnosis     Ruptured ovarian cyst     Contraceptive management     History of methamphetamine abuse (H)     BRIAN (generalized anxiety disorder)     Moderate single current episode of major depressive disorder (H)     Gestational diabetes mellitus (GDM) in third trimester     Encounter for triage in pregnant patient      (normal spontaneous vaginal delivery)--2019     Preeclampsia, severe, third trimester--Magnesium---2019     Hypertension in pregnancy, preeclampsia, severe, postpartum condition--ECHO normal, EKG abnormal P waves, CT, Labetolol-Hydralazine--5/15/2019     Anemia due to blood loss, acute, due to Vaginal Delivery, NOT complicated--2019       Pt discharged to home at 1:15 PM and encouraged to rest and drink plenty of fluids.  Pt told to call/return if bleeding more than spotting, water breaks, contractions 3-5 minutes apart that she has to breath through.     Nursing education on when to call MD provided. Self-management instructions reviewed.  AVS given and signed. All questions answered and patient verbalizes understanding.    /64   Pulse 83   Temp 98  F (36.7  C) (Oral)   Resp 16   SpO2 99%     Fetal Assessment:     Discharge support:  Family/Friend Support    OB History    Para Term  AB Living   2 1 1 0 0 1   SAB TAB Ectopic Multiple Live Births   0 0 0 0 1      # Outcome Date GA Lbr Deshaun/2nd Weight Sex Delivery Anes PTL Lv   2 Current            1 Term 19 38w4d  2.69 kg (5 lb 14.9 oz) M  EPI  LUIS ENRIQUE      Name: LISA,MALE-DIANA      Apgar1: 8  Apgar5: 9     Dr. Peters notified of patient's monitoring and C/O. Patient discharge to home  Shivani Quach RN

## 2020-02-04 PROCEDURE — 40000268 ZZH STATISTIC NO CHARGES

## 2020-03-02 ENCOUNTER — HEALTH MAINTENANCE LETTER (OUTPATIENT)
Age: 20
End: 2020-03-02

## 2020-03-16 NOTE — ED NOTES
Dr Yeager at bedside   Ears: no ear pain and no hearing problems.Nose: no nasal congestion and no nasal drainage.Mouth/Throat: no dysphagia, no hoarseness and no throat pain.Neck: no lumps, no pain, no stiffness and no swollen glands.

## 2020-05-09 ENCOUNTER — HOSPITAL ENCOUNTER (INPATIENT)
Facility: HOSPITAL | Age: 20
LOS: 3 days | Discharge: HOME | End: 2020-05-12
Attending: FAMILY MEDICINE | Admitting: FAMILY MEDICINE
Payer: COMMERCIAL

## 2020-05-09 DIAGNOSIS — F32.1 MODERATE SINGLE CURRENT EPISODE OF MAJOR DEPRESSIVE DISORDER (H): Primary | ICD-10-CM

## 2020-05-09 LAB
ALBUMIN UR-MCNC: 30 MG/DL
AMPHETAMINES UR QL: ABNORMAL NG/ML
APPEARANCE UR: CLEAR
BACTERIA #/AREA URNS HPF: ABNORMAL /HPF
BARBITURATES UR QL SCN: NOT DETECTED NG/ML
BASOPHILS # BLD AUTO: 0 10E9/L (ref 0–0.2)
BASOPHILS NFR BLD AUTO: 0.2 %
BENZODIAZ UR QL SCN: NOT DETECTED NG/ML
BILIRUB UR QL STRIP: NEGATIVE
BUPRENORPHINE UR QL: NOT DETECTED NG/ML
CANNABINOIDS UR QL: ABNORMAL NG/ML
COCAINE UR QL SCN: NOT DETECTED NG/ML
COLOR UR AUTO: YELLOW
D-METHAMPHET UR QL: ABNORMAL NG/ML
DIFFERENTIAL METHOD BLD: ABNORMAL
EOSINOPHIL # BLD AUTO: 0 10E9/L (ref 0–0.7)
EOSINOPHIL NFR BLD AUTO: 0.2 %
ERYTHROCYTE [DISTWIDTH] IN BLOOD BY AUTOMATED COUNT: 14.4 % (ref 10–15)
GLUCOSE UR STRIP-MCNC: NEGATIVE MG/DL
HCT VFR BLD AUTO: 41.3 % (ref 35–47)
HGB BLD-MCNC: 14.5 G/DL (ref 11.7–15.7)
HGB UR QL STRIP: ABNORMAL
IMM GRANULOCYTES # BLD: 0.1 10E9/L (ref 0–0.4)
IMM GRANULOCYTES NFR BLD: 0.6 %
KETONES UR STRIP-MCNC: NEGATIVE MG/DL
LEUKOCYTE ESTERASE UR QL STRIP: NEGATIVE
LYMPHOCYTES # BLD AUTO: 3 10E9/L (ref 0.8–5.3)
LYMPHOCYTES NFR BLD AUTO: 24.1 %
MCH RBC QN AUTO: 29.3 PG (ref 26.5–33)
MCHC RBC AUTO-ENTMCNC: 35.1 G/DL (ref 31.5–36.5)
MCV RBC AUTO: 83 FL (ref 78–100)
METHADONE UR QL SCN: NOT DETECTED NG/ML
MONOCYTES # BLD AUTO: 0.6 10E9/L (ref 0–1.3)
MONOCYTES NFR BLD AUTO: 4.8 %
MUCOUS THREADS #/AREA URNS LPF: PRESENT /LPF
NEUTROPHILS # BLD AUTO: 8.6 10E9/L (ref 1.6–8.3)
NEUTROPHILS NFR BLD AUTO: 70.1 %
NITRATE UR QL: NEGATIVE
NRBC # BLD AUTO: 0 10*3/UL
NRBC BLD AUTO-RTO: 0 /100
OPIATES UR QL SCN: NOT DETECTED NG/ML
OXYCODONE UR QL SCN: NOT DETECTED NG/ML
PCP UR QL SCN: NOT DETECTED NG/ML
PH UR STRIP: 6 PH (ref 4.7–8)
PLATELET # BLD AUTO: 383 10E9/L (ref 150–450)
PROPOXYPH UR QL: NOT DETECTED NG/ML
RBC # BLD AUTO: 4.95 10E12/L (ref 3.8–5.2)
RBC #/AREA URNS AUTO: 27 /HPF (ref 0–2)
SOURCE: ABNORMAL
SP GR UR STRIP: 1.02 (ref 1–1.03)
SQUAMOUS #/AREA URNS AUTO: 4 /HPF (ref 0–1)
TRICYCLICS UR QL SCN: NOT DETECTED NG/ML
UROBILINOGEN UR STRIP-MCNC: NORMAL MG/DL (ref 0–2)
WBC # BLD AUTO: 12.3 10E9/L (ref 4–11)
WBC #/AREA URNS AUTO: 5 /HPF (ref 0–5)

## 2020-05-09 PROCEDURE — 72200001 ZZH LABOR CARE VAGINAL DELIVERY SINGLE

## 2020-05-09 PROCEDURE — 12000000 ZZH R&B MED SURG/OB

## 2020-05-09 PROCEDURE — 80306 DRUG TEST PRSMV INSTRMNT: CPT | Performed by: FAMILY MEDICINE

## 2020-05-09 PROCEDURE — G0463 HOSPITAL OUTPT CLINIC VISIT: HCPCS

## 2020-05-09 PROCEDURE — 25000132 ZZH RX MED GY IP 250 OP 250 PS 637

## 2020-05-09 PROCEDURE — 85025 COMPLETE CBC W/AUTO DIFF WBC: CPT | Performed by: FAMILY MEDICINE

## 2020-05-09 PROCEDURE — 86900 BLOOD TYPING SEROLOGIC ABO: CPT | Performed by: FAMILY MEDICINE

## 2020-05-09 PROCEDURE — 10907ZC DRAINAGE OF AMNIOTIC FLUID, THERAPEUTIC FROM PRODUCTS OF CONCEPTION, VIA NATURAL OR ARTIFICIAL OPENING: ICD-10-PCS | Performed by: FAMILY MEDICINE

## 2020-05-09 PROCEDURE — 25000128 H RX IP 250 OP 636

## 2020-05-09 PROCEDURE — 81001 URINALYSIS AUTO W/SCOPE: CPT | Performed by: FAMILY MEDICINE

## 2020-05-09 PROCEDURE — 87635 SARS-COV-2 COVID-19 AMP PRB: CPT | Performed by: FAMILY MEDICINE

## 2020-05-09 PROCEDURE — 86850 RBC ANTIBODY SCREEN: CPT | Performed by: FAMILY MEDICINE

## 2020-05-09 PROCEDURE — 86901 BLOOD TYPING SEROLOGIC RH(D): CPT | Performed by: FAMILY MEDICINE

## 2020-05-09 PROCEDURE — 25800030 ZZH RX IP 258 OP 636: Performed by: FAMILY MEDICINE

## 2020-05-09 PROCEDURE — 86780 TREPONEMA PALLIDUM: CPT | Performed by: FAMILY MEDICINE

## 2020-05-09 RX ORDER — ACETAMINOPHEN 325 MG/1
650 TABLET ORAL EVERY 4 HOURS PRN
Status: DISCONTINUED | OUTPATIENT
Start: 2020-05-09 | End: 2020-05-10

## 2020-05-09 RX ORDER — ONDANSETRON 2 MG/ML
4 INJECTION INTRAMUSCULAR; INTRAVENOUS EVERY 6 HOURS PRN
Status: DISCONTINUED | OUTPATIENT
Start: 2020-05-09 | End: 2020-05-10 | Stop reason: CLARIF

## 2020-05-09 RX ORDER — IBUPROFEN 800 MG/1
TABLET, FILM COATED ORAL
Status: COMPLETED
Start: 2020-05-09 | End: 2020-05-09

## 2020-05-09 RX ORDER — OXYTOCIN/0.9 % SODIUM CHLORIDE 30/500 ML
PLASTIC BAG, INJECTION (ML) INTRAVENOUS
Status: DISPENSED
Start: 2020-05-09 | End: 2020-05-10

## 2020-05-09 RX ORDER — OXYTOCIN 10 [USP'U]/ML
10 INJECTION, SOLUTION INTRAMUSCULAR; INTRAVENOUS
Status: COMPLETED | OUTPATIENT
Start: 2020-05-09 | End: 2020-05-09

## 2020-05-09 RX ORDER — OXYTOCIN/0.9 % SODIUM CHLORIDE 30/500 ML
340 PLASTIC BAG, INJECTION (ML) INTRAVENOUS CONTINUOUS PRN
Status: DISCONTINUED | OUTPATIENT
Start: 2020-05-09 | End: 2020-05-12 | Stop reason: HOSPADM

## 2020-05-09 RX ORDER — NALOXONE HYDROCHLORIDE 0.4 MG/ML
.1-.4 INJECTION, SOLUTION INTRAMUSCULAR; INTRAVENOUS; SUBCUTANEOUS
Status: DISCONTINUED | OUTPATIENT
Start: 2020-05-09 | End: 2020-05-12 | Stop reason: HOSPADM

## 2020-05-09 RX ORDER — OXYTOCIN 10 [USP'U]/ML
INJECTION, SOLUTION INTRAMUSCULAR; INTRAVENOUS
Status: COMPLETED
Start: 2020-05-09 | End: 2020-05-09

## 2020-05-09 RX ORDER — PENICILLIN G POTASSIUM 5000000 [IU]/1
INJECTION, POWDER, FOR SOLUTION INTRAMUSCULAR; INTRAVENOUS
Status: COMPLETED
Start: 2020-05-09 | End: 2020-05-09

## 2020-05-09 RX ORDER — OXYCODONE AND ACETAMINOPHEN 5; 325 MG/1; MG/1
1 TABLET ORAL
Status: DISCONTINUED | OUTPATIENT
Start: 2020-05-09 | End: 2020-05-12 | Stop reason: HOSPADM

## 2020-05-09 RX ORDER — METHYLERGONOVINE MALEATE 0.2 MG/ML
200 INJECTION INTRAVENOUS
Status: DISCONTINUED | OUTPATIENT
Start: 2020-05-09 | End: 2020-05-12 | Stop reason: HOSPADM

## 2020-05-09 RX ORDER — OXYTOCIN/0.9 % SODIUM CHLORIDE 30/500 ML
100-340 PLASTIC BAG, INJECTION (ML) INTRAVENOUS CONTINUOUS PRN
Status: DISCONTINUED | OUTPATIENT
Start: 2020-05-09 | End: 2020-05-10 | Stop reason: CLARIF

## 2020-05-09 RX ORDER — FENTANYL CITRATE 50 UG/ML
50-100 INJECTION, SOLUTION INTRAMUSCULAR; INTRAVENOUS
Status: DISCONTINUED | OUTPATIENT
Start: 2020-05-09 | End: 2020-05-10 | Stop reason: CLARIF

## 2020-05-09 RX ORDER — CARBOPROST TROMETHAMINE 250 UG/ML
250 INJECTION, SOLUTION INTRAMUSCULAR
Status: DISCONTINUED | OUTPATIENT
Start: 2020-05-09 | End: 2020-05-12 | Stop reason: HOSPADM

## 2020-05-09 RX ORDER — ACETAMINOPHEN 325 MG/1
650 TABLET ORAL EVERY 4 HOURS PRN
Status: DISCONTINUED | OUTPATIENT
Start: 2020-05-09 | End: 2020-05-12 | Stop reason: HOSPADM

## 2020-05-09 RX ORDER — IBUPROFEN 800 MG/1
800 TABLET, FILM COATED ORAL EVERY 6 HOURS PRN
Status: DISCONTINUED | OUTPATIENT
Start: 2020-05-10 | End: 2020-05-12 | Stop reason: HOSPADM

## 2020-05-09 RX ORDER — IBUPROFEN 800 MG/1
800 TABLET, FILM COATED ORAL
Status: COMPLETED | OUTPATIENT
Start: 2020-05-09 | End: 2020-05-09

## 2020-05-09 RX ORDER — OXYTOCIN 10 [USP'U]/ML
10 INJECTION, SOLUTION INTRAMUSCULAR; INTRAVENOUS
Status: DISCONTINUED | OUTPATIENT
Start: 2020-05-09 | End: 2020-05-12 | Stop reason: HOSPADM

## 2020-05-09 RX ORDER — SODIUM CHLORIDE, SODIUM LACTATE, POTASSIUM CHLORIDE, CALCIUM CHLORIDE 600; 310; 30; 20 MG/100ML; MG/100ML; MG/100ML; MG/100ML
INJECTION, SOLUTION INTRAVENOUS CONTINUOUS
Status: DISCONTINUED | OUTPATIENT
Start: 2020-05-09 | End: 2020-05-10 | Stop reason: CLARIF

## 2020-05-09 RX ORDER — OXYTOCIN/0.9 % SODIUM CHLORIDE 30/500 ML
100 PLASTIC BAG, INJECTION (ML) INTRAVENOUS CONTINUOUS PRN
Status: DISCONTINUED | OUTPATIENT
Start: 2020-05-09 | End: 2020-05-10 | Stop reason: CLARIF

## 2020-05-09 RX ADMIN — OXYTOCIN 10 UNITS: 10 INJECTION, SOLUTION INTRAMUSCULAR; INTRAVENOUS at 21:45

## 2020-05-09 RX ADMIN — PENICILLIN G POTASSIUM 5 MILLION UNITS: 5000000 POWDER, FOR SOLUTION INTRAMUSCULAR; INTRAPLEURAL; INTRATHECAL; INTRAVENOUS at 21:21

## 2020-05-09 RX ADMIN — IBUPROFEN 800 MG: 800 TABLET, FILM COATED ORAL at 22:04

## 2020-05-09 RX ADMIN — SODIUM CHLORIDE, POTASSIUM CHLORIDE, SODIUM LACTATE AND CALCIUM CHLORIDE: 600; 310; 30; 20 INJECTION, SOLUTION INTRAVENOUS at 21:21

## 2020-05-09 RX ADMIN — IBUPROFEN 800 MG: 800 TABLET ORAL at 22:04

## 2020-05-10 PROBLEM — D62 ANEMIA DUE TO BLOOD LOSS, ACUTE: Status: RESOLVED | Noted: 2019-05-16 | Resolved: 2020-05-10

## 2020-05-10 PROBLEM — F12.20 MARIJUANA DEPENDENCE (H): Status: ACTIVE | Noted: 2020-05-10

## 2020-05-10 PROBLEM — Z36.89 ENCOUNTER FOR TRIAGE IN PREGNANT PATIENT: Status: RESOLVED | Noted: 2019-05-12 | Resolved: 2020-05-10

## 2020-05-10 PROBLEM — F15.10 METHAMPHETAMINE ABUSE (H): Status: ACTIVE | Noted: 2018-11-16

## 2020-05-10 PROBLEM — O14.13 PREECLAMPSIA, SEVERE, THIRD TRIMESTER: Status: RESOLVED | Noted: 2019-05-16 | Resolved: 2020-05-10

## 2020-05-10 PROBLEM — O24.419 GESTATIONAL DIABETES MELLITUS (GDM) IN THIRD TRIMESTER: Status: RESOLVED | Noted: 2019-04-16 | Resolved: 2020-05-10

## 2020-05-10 PROBLEM — F41.1 GAD (GENERALIZED ANXIETY DISORDER): Status: RESOLVED | Noted: 2018-01-30 | Resolved: 2020-05-10

## 2020-05-10 LAB
ABO + RH BLD: NORMAL
ABO + RH BLD: NORMAL
BLD GP AB SCN SERPL QL: NORMAL
BLOOD BANK CMNT PATIENT-IMP: NORMAL
HIV1+2 AB SPEC QL IA.RAPID: NONREACTIVE
SARS-COV-2 PCR COMMENT: NORMAL
SARS-COV-2 RNA SPEC QL NAA+PROBE: NEGATIVE
SARS-COV-2 RNA SPEC QL NAA+PROBE: NORMAL
SPECIMEN EXP DATE BLD: NORMAL
SPECIMEN SOURCE: NORMAL
SPECIMEN SOURCE: NORMAL

## 2020-05-10 PROCEDURE — 12000000 ZZH R&B MED SURG/OB

## 2020-05-10 PROCEDURE — 25000132 ZZH RX MED GY IP 250 OP 250 PS 637: Performed by: FAMILY MEDICINE

## 2020-05-10 PROCEDURE — 36415 COLL VENOUS BLD VENIPUNCTURE: CPT | Performed by: FAMILY MEDICINE

## 2020-05-10 PROCEDURE — 86703 HIV-1/HIV-2 1 RESULT ANTBDY: CPT | Performed by: FAMILY MEDICINE

## 2020-05-10 PROCEDURE — 87340 HEPATITIS B SURFACE AG IA: CPT | Performed by: FAMILY MEDICINE

## 2020-05-10 PROCEDURE — 86803 HEPATITIS C AB TEST: CPT | Performed by: FAMILY MEDICINE

## 2020-05-10 RX ADMIN — IBUPROFEN 800 MG: 800 TABLET ORAL at 06:07

## 2020-05-10 RX ADMIN — IBUPROFEN 800 MG: 800 TABLET ORAL at 14:44

## 2020-05-10 RX ADMIN — IBUPROFEN 800 MG: 800 TABLET ORAL at 23:06

## 2020-05-10 RX ADMIN — SERTRALINE HYDROCHLORIDE 50 MG: 50 TABLET ORAL at 21:11

## 2020-05-10 NOTE — PLAN OF CARE
Face to face report given with opportunity to observe patient.    Report given to JESSICA Tapia RN   5/09/2020  11:10 PM

## 2020-05-10 NOTE — H&P
"No significant change in general health status based on examination of the patient, review of Nursing Admission Database and prenatal record. Mother presented in active labor by ambulance at 36 5/7 weeks. Hx of methamphetamine and marijuana use. Had \"fallen off the wagon\" three weeks ago and lost custody again of her older son. Does not appear high now. Drug screen pending. When I arrived she was 9+ cm.    EFW: 6lb  "

## 2020-05-10 NOTE — PLAN OF CARE
Assessments completed as charted. B/P: 121/69, T: 98, P: 71, R: 18. Rates pain: 0/10. Voiding without difficulty. Fundus: Midline and firm. Lochia: Light. Activity: unrestricted with out pain  and normal activity. Infant feeding: Formula.   Postpartum care education provided, see Patient Education activity. Patient denies needs. Will monitor.  Shaun Nagy RN

## 2020-05-10 NOTE — PLAN OF CARE
Assumed care of patient.  Obtained bedside report.  Patient appears in no acute distress. Will continue to monitor.

## 2020-05-10 NOTE — L&D DELIVERY NOTE
admitted in active labor. Uneventful delivery after fast labor. Tested positive for methamphetamine and marijuana. Baby placed on a 72 hour hold. Parents very cooperative.     Labor Event Times    Labor onset date:  20 Onset time:   7:00 PM   Dilation complete date:  20 Complete time:   9:41 PM   Start pushing date/time:  2020      Labor Length    1st Stage (hrs):  2 (min):  41   2nd Stage (hrs):  0 (min):  1   3rd Stage (hrs):  0 (min):  3      Labor Events     labor?:  Yes   steroids:  None  Labor Type:  Spontaneous  Predominate monitoring during 1st stage:  intermittent electronic fetal monitoring     Antibiotics received during labor?:  Yes  Reason for Antibiotics:  GBS  Antibiotics received for GBS:  Penicillin  Antibiotics Given (GBS):  Less than or equal to 4 hours prior to delivery     Rupture date/time: 20   Rupture type:  Artificial Rupture of Membranes  Fluid color:  Clear  Fluid odor:  Normal     1:1 continuous labor support provided by?:  RN       Delivery/Placenta Date and Time    Delivery Date:  20 Delivery Time:   9:42 PM   Placenta Date/Time:  2020  9:45 PM  Oxytocin given at the time of delivery:  after delivery of placenta     Vaginal Counts     Initial count performed by 2 team members:   Two Team Members   ANGY Milan RN       Needles Suture [place] Sponges Instruments   Initial counts 2  15 10   Added to count       Final counts 2  15 10   Placed during labor Accounted for at the end of labor   No    No    No     Final count performed by 2 team members:   Two Team Members   ANGY Milan RN         Apgars    Living status:  Living   1 Minute 5 Minute 10 Minute 15 Minute 20 Minute   Skin color: 1  1       Heart rate: 2  2       Reflex irritability: 2  2       Muscle tone: 2  2       Respiratory effort: 2  2       Total: 9  9       Apgars assigned by:  BLAKE MILAN RN     Cord    Vessels:  3 Vessels Complications:  None   Cord  "Blood Disposition:  Lab       [place] Resuscitation    Methods:  None  [place] Care at Delivery:  Infant male born . Brought skin to skin. Dried and stimulated. Lusty cry produced. Skin pinking up. HAMMOND's.      Measurements    Weight:  6 lb 4 oz Length:  1' 6.5\"   Head circumference:  30.5 cm Chest circumference:  30.5 cm   Abdominal girth:  25.4 cm  Birth Comments:  none     Skin to Skin and Feeding Plan    Skin to skin initiation date/time: 1841    Skin to skin with:  Mother  Skin to skin end date/time: 1841    How do you plan to feed your baby:  Formula     Labor Events and Shoulder Dystocia    Fetal Tracing Prior to Delivery:  Category 1  Shoulder dystocia present?:  Neg     Delivery (Maternal) (Provider to Complete) (439764)    Episiotomy:  None  Periurethral laceration:  left Repaired?:  No      Blood Loss  Mother: Shirley Daniels #[id number]   Start of Mother's Information    IO Blood Loss  20 1900 - 20 2344    None           End of Mother's Information  Mother: Shirley Daniels #[id number]         Delivery - Provider to Complete ([id number])    Delivering clinician:  Jorge L Peters MD  Attempted Delivery Types (Choose all that apply):  Spontaneous Vaginal Delivery  Delivery Type (Choose the 1 that will go to the Birth History):  Vaginal, Spontaneous          Placenta    Delayed Cord Clamping:  Done  Date/Time:  2020  9:45 PM  Removal:  Spontaneous  Disposition:  Hospital disposal     Anesthesia    Method:  None          Presentation and Position    Presentation:  Vertex  Position:  Left Occiput Anterior           Jorge L Peters MD  "

## 2020-05-10 NOTE — PROGRESS NOTES
[facility] Obstetrics Post-Partum Progress Note          Assessment and Plan:    Assessment:   Post-partum day #1  Normal spontaneous vaginal delivery  L&D complications: Maternal methamphetamine and marijuana use   Major depression and anxiety      Plan:   Ambulation encouraged  Restart Zoloft  She is getting in touch with her chemical dependency program.           Interval History:   Doing well.  Pain is well-controlled.  No fevers.  No history of foul-smelling vaginal discharge.  Good appetite.  Denies chest pain, shortness of breath, nausea or vomiting.  Vaginal bleeding is similar to a heavy menstrual flow.  Ambulatory.            Significant Problems:    None          Review of Systems:    The patient denies any chest pain, shortness of breath, excessive pain, fever, chills, purulent drainage from the wound, nausea or vomiting.          Medications:       sertraline  50 mg Oral QPM             Physical Exam:   All vitals stable  Uterine fundus is firm, non-tender and at the level of the umbilicus          Data:     Hemoglobin   Date Value Ref Range Status   05/09/2020 14.5 11.7 - 15.7 g/dL Final     No imaging studies have been ordered    Jorge L Peters MD

## 2020-05-10 NOTE — PLAN OF CARE
Assessments completed as charted. B/P: 127/75, T: 98.4, P: 83, R: 16. Rates pain: 4/10 perineum. Voiding without difficulty. Fundus: Midline 1 below Umbilics. Lochia: Light. Activity: unrestricted with out pain . Infant feeding: Formula.   Continuous 1:1 observation of baby in patient's room related to 72 hour hold because of Methamphetamine and THC use during pregnancy.  Postpartum care education provided, see Patient Education activity. Patient denies needs. Will monitor.  JAZZY SIMPSON RN

## 2020-05-10 NOTE — PLAN OF CARE
Assessments completed as charted. B/P: 106/57, T: 97.7, P: 57, R: 18. Rates pain: 5/10. Voiding without difficulty. Fundus: Midline and firm. Lochia: Light. Activity: unrestricted with out pain . Infant feeding: Formula.   Postpartum care education provided, see Patient Education activity. Patient denies needs. Will monitor.  Shaun Nagy RN

## 2020-05-10 NOTE — PLAN OF CARE
Labor Admission  Shirley Daniels  MRN: [id number]  Gestational Age: 36w5d      Shirley Daniels is admitted for active labor.  States arjun since 1930.  Rates pain at 10/10.  spotting and bloody show  began at 7:00 PM.  Denies LOF.      Dr. Peters notified of arrival and condition and Intrapartum orders initiated.      FHT: 115  NST: Reactive .  Uterine Assessment: frequency q 2 minutes, Contractions: Strength: strong quality.     Patient is alert and oriented X 3,Patient oriented to room, unit, hourly rounding, and plan of care.  Call light within reach. Explained admission packet with patient bill of rights brochure. Will continue to monitor and document as needed.     Inpatient nursing criteria listed below was met:    Health care directives status obtained and documented: Yes  Patient identifies a surrogate decision maker: Yes   If yes, who:Emerson Aguilera Contact Information:[phone]  Core Measure diagnosis present:: No  Vaccine assessment done and vaccines ordered if appropriate. Yes  Clergy visit ordered if patient requests: N/A  Skin issues/needs documented:N/A  Isolation needs addressed, if appropriate: N/A  Fall Prevention (Med and High risk): Care plan updated, Education given and documented and signage used: Yes  Care Plan initiated: Yes  Education Documented (Reminder to educate patient if MRSA is present on admission): Yes  Education Assessment documented:Yes  Patient has discharge needs (If yes, please explain): Yes, CPS involved with family

## 2020-05-10 NOTE — PLAN OF CARE
Vaginal Delivery Note   of viable Male.  Nursery RN Gloria Milan RN present.  Infant with spontaneous cry, to mother's abdomen, dried and stimulated. [place] cares provided.  Mother and baby in stable condition. Baby skin-to-skin with mom. ID bands placed on infant, mom and dad.

## 2020-05-11 LAB
HBV SURFACE AG SERPL QL IA: NONREACTIVE
HCV AB SERPL QL IA: NONREACTIVE
T PALLIDUM AB SER QL: NONREACTIVE

## 2020-05-11 PROCEDURE — 25000132 ZZH RX MED GY IP 250 OP 250 PS 637: Performed by: FAMILY MEDICINE

## 2020-05-11 PROCEDURE — 12000000 ZZH R&B MED SURG/OB

## 2020-05-11 RX ADMIN — IBUPROFEN 800 MG: 800 TABLET ORAL at 19:06

## 2020-05-11 RX ADMIN — SERTRALINE HYDROCHLORIDE 50 MG: 50 TABLET ORAL at 21:22

## 2020-05-11 NOTE — PLAN OF CARE
Assessments completed as charted. B/P: 135/82, T: 97.8, P: 73, R: 18. Rates pain: 0/10. Voiding without difficulty. Fundus: Midline and firm. Lochia: Light. Activity: normal activity. Infant feeding: Formula.  Patient and significant other are providing cares for baby, diaper changes, dressing and attentive to needs this afternoon.   is rooming-in with 1:1 nurse.    Postpartum care education provided, see Patient Education activity. Patient denies needs. Will monitor.  Shaun Nagy RN

## 2020-05-11 NOTE — PLAN OF CARE
Patient requested to see baby.  [place] is 1:1 in nursery.   accompanied to patients room with nurse.   General Cardiac

## 2020-05-11 NOTE — PROGRESS NOTES
[facility] Obstetrics Post-Partum Progress Note          Assessment and Plan:    Assessment:   Post-partum day #2  Normal spontaneous vaginal delivery  L&D complications: Maternal methamphetamine and marijuana         Plan:   Told me yesterday that they wanted to spend as much time as possible with the child. Have only had him in their room 2-3 hours per shift. Restarted sertraline.  Await child protection follow up.           Interval History:   Doing well.  Pain is adequately controlled.  No fevers.  No history of foul-smelling vaginal discharge.  Good appetite.  Denies chest pain, shortness of breath, nausea or vomiting.  Vaginal bleeding is similar to a heavy menstrual flow.           Significant Problems:    None          Review of Systems:    The patient denies any chest pain, shortness of breath, excessive pain, fever, chills, purulent drainage from the wound, nausea or vomiting.          Medications:       sertraline  50 mg Oral QPM             Physical Exam:   All vitals stable  Uterine fundus is firm, non-tender and at the level of the umbilicus minus two          Data:     Lab Results   Component Value Date    WBC 12.3 (H) 05/09/2020    HGB 14.5 05/09/2020    HCT 41.3 05/09/2020    MCV 83 05/09/2020     05/09/2020     No imaging studies have been ordered    Jorge L Peters MD

## 2020-05-11 NOTE — PLAN OF CARE
Obtained a phone call from Lab in the [facility], pt's COVID 19 test was negative.

## 2020-05-11 NOTE — PLAN OF CARE
Pt up ad fran in the room.  Reports adequate pain control with prn pain meds.  Uterus firm at U-1, light lochia.   Pt watching TV with significant other.   She appears happy, smiling during conversation and affectionate toward significant other.  Snacks brought into room per request.  No request by pt or significant other to have baby brought into the room.  Baby remains in the nursery with 1:1 RN.  Will continue to monitor pt and provide cares as needed.

## 2020-05-11 NOTE — PLAN OF CARE
Assessments completed as charted. B/P: 122/77, T: 97.6, P: 57, R: 16. Rates pain: 5/10, but reports improvement in pain with prn ibuprofen. Voiding without difficulty. Fundus: Midline at U-1. Lochia: Light. Activity: unrestricted with out pain . Infant feeding: Formula feeding.  Discussed breast care with pt.  Baby into parent room with night shift assessment, per 1:1 RN.  Pt and significant other appeared eager to hold baby.  Both parents held baby and were responding to baby's needs.  1:1 RN remains in room while baby was with parents.       Postpartum care education provided, see Patient Education activity. Patient denies needs. Will monitor.  Micaela Fernandes RN

## 2020-05-11 NOTE — PLAN OF CARE
Assessments completed as charted. B/P: 119/73, T: 98.1, P: 60, R: 16. Rates pain: 0/10. Voiding without difficulty. Fundus: Midline Firm. Lochia: Light. Activity: unrestricted with out pain . Infant feeding: Formula. Baby currently with nurse and mother back to sleep in bed after assessment completed.     Postpartum care education provided, see Patient Education activity. Patient denies needs. Will monitor.  Mayra Arriaga RN

## 2020-05-12 VITALS
HEART RATE: 60 BPM | SYSTOLIC BLOOD PRESSURE: 124 MMHG | OXYGEN SATURATION: 98 % | TEMPERATURE: 98.1 F | RESPIRATION RATE: 16 BRPM | DIASTOLIC BLOOD PRESSURE: 76 MMHG

## 2020-05-12 PROCEDURE — 0JHF3HZ INSERTION OF CONTRACEPTIVE DEVICE INTO LEFT UPPER ARM SUBCUTANEOUS TISSUE AND FASCIA, PERCUTANEOUS APPROACH: ICD-10-PCS | Performed by: FAMILY MEDICINE

## 2020-05-12 PROCEDURE — 25000128 H RX IP 250 OP 636: Performed by: FAMILY MEDICINE

## 2020-05-12 PROCEDURE — 25000125 ZZHC RX 250

## 2020-05-12 RX ORDER — LIDOCAINE HYDROCHLORIDE 10 MG/ML
INJECTION, SOLUTION EPIDURAL; INFILTRATION; INTRACAUDAL; PERINEURAL
Status: COMPLETED
Start: 2020-05-12 | End: 2020-05-12

## 2020-05-12 RX ADMIN — ETONOGESTREL 68 MG: 68 IMPLANT SUBCUTANEOUS at 10:31

## 2020-05-12 RX ADMIN — LIDOCAINE HYDROCHLORIDE 40 MG: 10 INJECTION, SOLUTION EPIDURAL; INFILTRATION; INTRACAUDAL; PERINEURAL at 10:38

## 2020-05-12 NOTE — DISCHARGE INSTRUCTIONS
Follow up with Dr. Peters 1 month  at 2:45pm-Virtual appointment.  Dr. Peters will call at 2:45pm.  Virtual appointment confirmed with both Dr. Peters and patient.      Vaginal Delivery or  Birth   Discharge Instructions    Activity: Go back to your normal activities, except Pelvic Rest     Diet: You may eat a regular diet. Drink plenty of fluids.      Call your Doctor  if you have any of these symptoms:    * You soak a sanitary pad with blood within 1 hour, or you see clots larger than a  golf ball.    * Bleeding that lasts more than 6 weeks.     *Bad-smelling fluid that comes out of your vagina.    *A fever above 100.4 degrees Fahrenheit (38.4 degrees Celsius) with or without chills.    * Severe pain, cramping, or tenderness in your lower belly area.    * Increased pain, swelling, redness or fluid around your stitches.    * A need to urinate more frequently (use the toilet more often), more urgently (use the toilet very quickly), or it burns when you urinate.    * Redness, swelling, or pain around a vein in your leg.    * Problems coping with sadness, anxiety, or depression.    * You have questions or concerns after you return home.      [facility]: [phone]

## 2020-05-12 NOTE — PLAN OF CARE
Patient discharged accompanied by significant other.  Denies pain.  Education completed, AVS signed, No further questions/concerns.

## 2020-05-12 NOTE — PROCEDURES
Patient requests Nexplanon for contraception. Risks, benefits pros and cons discussed. She wishes to proceed. She has had one previously. Consent was signed.    Patient was positioned with left arm exposed. Site was identified and prepped. Nexplanon was placed without difficulty. Bleeding was minimal. Patient tolerated the insertion well. Both patient and I were able to palpate the insert. Card was given to remind her of need for removal in three years.

## 2020-05-12 NOTE — PLAN OF CARE
Assessments completed as charted. B/P: 124/76, T: 98.1, P: 60, R: 16. Rates pain: 0/10. Voiding without difficulty. Fundus: Midline Firm. Lochia: Light. Activity: unrestricted with out pain . Infant feeding: Formula in nursery with staff.     Postpartum care education provided, see Patient Education activity. Patient denies needs. Will monitor.  Mayra Arriaga RN

## 2020-05-12 NOTE — DISCHARGE SUMMARY
[facility] Obstetrics Post-Partum Progress Note          Assessment and Plan:    Assessment:   Post-partum day #3  Normal spontaneous vaginal delivery  L&D complications:     Moderate single current episode of major depressive disorder (H)     Marijuana dependence (H)     Postpartum care following vaginal delivery   Methamphetamine dependence      Desire for Nexplanon placement      Plan:   Nexplanon placed prior to discharge.  Follow up with me in one month  Going to inpatient chemical dependency treatment 5/14/2020  Child protection involved. Baby placed in foster care.           Interval History:   Doing well.  Pain is well-controlled.  No fevers.  No history of foul-smelling vaginal discharge.  Good appetite.  Denies chest pain, shortness of breath, nausea or vomiting.  Vaginal bleeding is similar to a heavy menstrual flow.  Ambulatory. Baby in room 2-3 hours per shift.          Significant Problems:    Active Problems:    Methamphetamine abuse (H)    Moderate single current episode of major depressive disorder (H)    Marijuana dependence (H)    Postpartum care following vaginal delivery            Review of Systems:    Negative. She has not had withdrawal symptoms.          Medications:      Shirley Daniels   Home Medication Instructions PASTOR:[id number]    Printed on:05/12/20 1141   Medication Information                      Prenatal Vit-Fe Fumarate-FA (PRENATAL VITAMIN PO)  Take 1 tablet by mouth daily             sertraline (ZOLOFT) 50 MG tablet  Take 1/2 tab for two weeks, then a whole tablet per day.                       Physical Exam:   Temp: 98.1  F (36.7  C) Temp src: Oral BP: 124/76 Pulse: 60   Resp: 16 SpO2: 98 %      Uterine fundus is firm, non-tender and at the level of the umbilicus          Data:     Lab Results   Component Value Date    WBC 12.3 (H) 05/09/2020    HGB 14.5 05/09/2020    HCT 41.3 05/09/2020     05/09/2020   Negative repeat HIV testing. Negative COVID 19.    No  imaging studies have been ordered    Jorge L Peters MD

## 2020-05-12 NOTE — PLAN OF CARE
Assessments completed as charted. B/P: 111/55, T: 98, P: 60, R: 16. Rates pain: 0/10 Patient denies pain at time of assessment. Voiding without difficulty. Fundus: Midline U/1. Lochia: Light. Activity: normal activity. Infant feeding: Formula. Feeding going well. Baby does spit up a small amount of formula after feeding.      Postpartum breastfeeding assessment completed and education provided, see Patient Education Activity.  Items included in the education are:   proper positioning and latch  effectiveness of feeding  manual expression  handling and storing breastmilk  maintenance of breastfeeding for the first 6 months  sign/symptoms of infant feeding issues requiring referral to qualified health care provider  Postpartum care education provided, see Patient Education activity. Patient denies needs. Will monitor.  Clarissa Stein RN

## 2020-07-08 ENCOUNTER — ALLIED HEALTH/NURSE VISIT (OUTPATIENT)
Dept: FAMILY MEDICINE | Facility: OTHER | Age: 20
End: 2020-07-08
Payer: COMMERCIAL

## 2020-07-08 DIAGNOSIS — Z20.822 COVID-19 RULED OUT: Primary | ICD-10-CM

## 2020-07-08 PROCEDURE — 99207 ZZC NO CHARGE NURSE ONLY: CPT

## 2020-07-08 PROCEDURE — U0003 INFECTIOUS AGENT DETECTION BY NUCLEIC ACID (DNA OR RNA); SEVERE ACUTE RESPIRATORY SYNDROME CORONAVIRUS 2 (SARS-COV-2) (CORONAVIRUS DISEASE [COVID-19]), AMPLIFIED PROBE TECHNIQUE, MAKING USE OF HIGH THROUGHPUT TECHNOLOGIES AS DESCRIBED BY CMS-2020-01-R: HCPCS | Mod: ZL

## 2020-07-08 PROCEDURE — C9803 HOPD COVID-19 SPEC COLLECT: HCPCS

## 2020-07-08 NOTE — LETTER
July 13, 2020        Shirley Daniels  [address]  [place] [address]    This letter provides a written record that you were tested for COVID-19 on 7/8/20.       Your result was negative. This means that we didn t find the virus that causes COVID-19 in your sample. A test may show negative when you do actually have the virus. This can happen when the virus is in the early stages of infection, before you feel illness symptoms.    If you have symptoms   Stay home and away from others (self-isolate) until you meet ALL of the guidelines below:    You ve had no fever--and no medicine that reduces fever--for 3 full days (72 hours). And      Your other symptoms have gotten better. For example, your cough or breathing has improved. And     At least 10 days have passed since your symptoms started.    During this time:    Stay home. Don t go to work, school or anywhere else.     Stay in your own room, including for meals. Use your own bathroom if you can.    Stay away from others in your home. No hugging, kissing or shaking hands. No visitors.    Clean  high touch  surfaces often (doorknobs, counters, handles, etc.). Use a household cleaning spray or wipes. You can find a full list on the EPA website at www.epa.gov/pesticide-registration/list-n-disinfectants-use-against-sars-cov-2.    Cover your mouth and nose with a mask, tissue or washcloth to avoid spreading germs.    Wash your hands and face often with soap and water.    Going back to work  Check with your employer for any guidelines to follow for going back to work.    Employers: This document serves as formal notice that your employee tested negative for COVID-19, as of the testing date shown above.

## 2020-07-10 LAB
SARS-COV-2 RNA SPEC QL NAA+PROBE: NOT DETECTED
SPECIMEN SOURCE: NORMAL

## 2020-08-08 ENCOUNTER — ALLIED HEALTH/NURSE VISIT (OUTPATIENT)
Dept: FAMILY MEDICINE | Facility: OTHER | Age: 20
End: 2020-08-08
Attending: FAMILY MEDICINE
Payer: COMMERCIAL

## 2020-08-08 DIAGNOSIS — Z20.822 EXPOSURE TO 2019 NOVEL CORONAVIRUS: Primary | ICD-10-CM

## 2020-08-08 PROCEDURE — U0003 INFECTIOUS AGENT DETECTION BY NUCLEIC ACID (DNA OR RNA); SEVERE ACUTE RESPIRATORY SYNDROME CORONAVIRUS 2 (SARS-COV-2) (CORONAVIRUS DISEASE [COVID-19]), AMPLIFIED PROBE TECHNIQUE, MAKING USE OF HIGH THROUGHPUT TECHNOLOGIES AS DESCRIBED BY CMS-2020-01-R: HCPCS | Mod: ZL | Performed by: FAMILY MEDICINE

## 2020-08-08 PROCEDURE — 99207 ZZC NO CHARGE NURSE ONLY: CPT

## 2020-08-08 PROCEDURE — C9803 HOPD COVID-19 SPEC COLLECT: HCPCS

## 2020-08-10 LAB
SARS-COV-2 RNA SPEC QL NAA+PROBE: NOT DETECTED
SPECIMEN SOURCE: NORMAL

## 2020-09-23 ENCOUNTER — ALLIED HEALTH/NURSE VISIT (OUTPATIENT)
Dept: FAMILY MEDICINE | Facility: OTHER | Age: 20
End: 2020-09-23
Payer: COMMERCIAL

## 2020-09-23 DIAGNOSIS — Z20.822 COVID-19 RULED OUT: Primary | ICD-10-CM

## 2020-09-23 PROCEDURE — U0003 INFECTIOUS AGENT DETECTION BY NUCLEIC ACID (DNA OR RNA); SEVERE ACUTE RESPIRATORY SYNDROME CORONAVIRUS 2 (SARS-COV-2) (CORONAVIRUS DISEASE [COVID-19]), AMPLIFIED PROBE TECHNIQUE, MAKING USE OF HIGH THROUGHPUT TECHNOLOGIES AS DESCRIBED BY CMS-2020-01-R: HCPCS | Mod: ZL

## 2020-09-23 PROCEDURE — C9803 HOPD COVID-19 SPEC COLLECT: HCPCS

## 2020-09-23 NOTE — NURSING NOTE
"Chief Complaint   Patient presents with     Covid 19 Testing     Patient swabbed for COVID-19 testing.  Jeny Romero LPN on 9/23/2020 at 12:49 PM      Initial There were no vitals taken for this visit. Estimated body mass index is 24.87 kg/m  as calculated from the following:    Height as of 5/12/19: 1.575 m (5' 2\").    Weight as of 5/12/19: 61.7 kg (136 lb).  Medication Reconciliation: complete    Jeny Romero LPN  "

## 2020-09-24 LAB
SARS-COV-2 RNA SPEC QL NAA+PROBE: NOT DETECTED
SPECIMEN SOURCE: NORMAL

## 2020-10-04 ENCOUNTER — OFFICE VISIT (OUTPATIENT)
Dept: FAMILY MEDICINE | Facility: OTHER | Age: 20
End: 2020-10-04
Attending: NURSE PRACTITIONER
Payer: COMMERCIAL

## 2020-10-04 VITALS
DIASTOLIC BLOOD PRESSURE: 70 MMHG | SYSTOLIC BLOOD PRESSURE: 114 MMHG | OXYGEN SATURATION: 96 % | RESPIRATION RATE: 18 BRPM | HEART RATE: 72 BPM | WEIGHT: 120.6 LBS | BODY MASS INDEX: 22.06 KG/M2 | TEMPERATURE: 98.4 F

## 2020-10-04 DIAGNOSIS — K04.7 DENTAL INFECTION: Primary | ICD-10-CM

## 2020-10-04 DIAGNOSIS — S02.5XXB OPEN FRACTURE OF TOOTH, INITIAL ENCOUNTER: ICD-10-CM

## 2020-10-04 PROCEDURE — 99203 OFFICE O/P NEW LOW 30 MIN: CPT | Performed by: NURSE PRACTITIONER

## 2020-10-04 PROCEDURE — G0463 HOSPITAL OUTPT CLINIC VISIT: HCPCS

## 2020-10-04 RX ORDER — AMOXICILLIN 875 MG
875 TABLET ORAL 2 TIMES DAILY
Qty: 20 TABLET | Refills: 0 | Status: SHIPPED | OUTPATIENT
Start: 2020-10-04 | End: 2020-10-14

## 2020-10-04 RX ORDER — PROPRANOLOL HYDROCHLORIDE 10 MG/1
TABLET ORAL
Status: ON HOLD | COMMUNITY
Start: 2020-09-09 | End: 2024-05-20

## 2020-10-04 RX ORDER — SERTRALINE HYDROCHLORIDE 100 MG/1
100 TABLET, FILM COATED ORAL DAILY
Status: ON HOLD | COMMUNITY
Start: 2020-09-09 | End: 2024-05-20

## 2020-10-04 RX ORDER — IBUPROFEN 600 MG/1
600 TABLET, FILM COATED ORAL EVERY 6 HOURS PRN
Qty: 30 TABLET | Refills: 0 | Status: ON HOLD | OUTPATIENT
Start: 2020-10-04 | End: 2024-05-31

## 2020-10-04 ASSESSMENT — PAIN SCALES - GENERAL: PAINLEVEL: WORST PAIN (10)

## 2020-10-04 NOTE — NURSING NOTE
"Chief Complaint   Patient presents with     Dental Pain     Left lower side molar pain started 1 month ago she has been taking ibuprofen every 4hours and it is not helping. The pain is radiating to her left ear    Initial There were no vitals taken for this visit. Estimated body mass index is 24.87 kg/m  as calculated from the following:    Height as of 5/12/19: 1.575 m (5' 2\").    Weight as of 5/12/19: 61.7 kg (136 lb).    Medication Reconciliation: complete      Ron Lawrence LPN  "

## 2020-10-04 NOTE — PROGRESS NOTES
HPI:    Shirley Daniels is a 19 year old female  who presents to [facility] today for dental pain.    Patient states she has been having left lower dental pain for the past month and now pain is radiating to her left ear.  Patient denies any swelling or pain along her jaw.  Patient states she has both hot and cold sensitivity on that left tooth that is bothering her.  Patient reports it is painful to chew on the left side.  Patient denies any fevers or chills.  Patient denies any headaches.  Patient denies sore throat.  Patient denies cough or breathing troubles.  Patient has been taking ibuprofen 400 mg every 4 hours.  Patient is a current resident at [facility].        Past Medical History:   Diagnosis Date     Anemia due to blood loss, acute, due to Vaginal Delivery, NOT complicated--5/12/2019 5/16/2019     Gestational diabetes mellitus (GDM) in third trimester 4/16/2019     Hypertension in pregnancy, preeclampsia, severe, postpartum condition--ECHO normal, EKG abnormal P waves, CT, Labetolol-Hydralazine--5/15/2019 5/16/2019     Preeclampsia, severe, third trimester--Magnesium---5/12/2019 5/16/2019     Ruptured ovarian cyst 2/10/2014    Ruptured ovarian cyst with hemoperitoneum and pelvic pain      Past Surgical History:   Procedure Laterality Date     C ETONOGESTREL IMPLANT SYSTEM  5/12/2020          HC REMOVE TONSILS/ADENOIDS,<13 Y/O       SURGICAL PATHOLOGY EXAM      removal of papiloma     Social History     Tobacco Use     Smoking status: Never Smoker     Smokeless tobacco: Never Used   Substance Use Topics     Alcohol use: No     Comment: last use month ago     Current Outpatient Medications   Medication Sig Dispense Refill     Prenatal Vit-Fe Fumarate-FA (PRENATAL VITAMIN PO) Take 1 tablet by mouth daily       propranolol (INDERAL) 10 MG tablet TAKE 1 TABLET BY MOUTH UP TO 3 TIMES PER DAY AS NEEDED FOR ANXIETY       sertraline (ZOLOFT) 100 MG tablet Take 100 mg by mouth daily       No  Known Allergies      Past medical history, past surgical history, current medications and allergies reviewed and accurate to the best of my knowledge.        ROS:  Refer to HPI    /70   Pulse 72   Temp 98.4  F (36.9  C) (Tympanic)   Resp 18   Wt 54.7 kg (120 lb 9.6 oz)   SpO2 96%   BMI 22.06 kg/m      EXAM:  General Appearance: Well appearing adolescent female, non-ill appearance, appropriate appearance for age. No acute distress  Ears: Left TM intact, translucent with bony landmarks appreciated, no erythema, serous effusion with mild bulging, no purulence.  Right TM intact, translucent with bony landmarks appreciated, no erythema, no effusion, no bulging, no purulence.  Left auditory canal clear.  Right auditory canal clear.  Normal external ears, non tender.  Eyes: conjunctivae normal without erythema or irritation, corneas clear, no drainage or crusting, no eyelid swelling, pupils equal   Orophayrnx: moist mucous membranes, posterior pharynx without erythema, tonsils without hypertrophy, no erythema, no exudates or petechiae, no post nasal drip seen, no trismus, voice clear.  Left lower first molar with missing portion of tooth along the gumline on the cheek side with surrounding swelling of the gum, no abscess or drainage noted.  Sinuses:  No sinus tenderness upon palpation of the frontal or maxillary sinuses  Nose:  Bilateral nares: no erythema, no edema, no drainage or congestion   Neck: supple without adenopathy  Respiratory: normal chest wall and respirations.  Normal effort.  No cough appreciated.  Musculoskeletal:  Equal movement of bilateral upper extremities.  Equal movement of bilateral lower extremities.  Normal gait.    Dermatological: No facial swelling, erythema or rash noted  Psychological: normal affect, alert, oriented, and pleasant.           ASSESSMENT/PLAN:  1. Dental infection    - amoxicillin (AMOXIL) 875 MG tablet; Take 1 tablet (875 mg) by mouth 2 times daily for 10 days   Dispense: 20 tablet; Refill: 0    - ibuprofen (ADVIL/MOTRIN) 600 MG tablet; Take 1 tablet (600 mg) by mouth every 6 hours as needed for moderate pain  Dispense: 30 tablet; Refill: 0    2. Open fracture of tooth, initial encounter    Patient was instructed to follow-up with dentist as soon as able to address the broken tooth.      I explained my diagnostic considerations and recommendations to the patient, who voiced understanding and agreement with the treatment plan. All questions were answered. We discussed potential side effects of any prescribed or recommended therapies, as well as expectations for response to treatments.    Disclaimer:  This note consists of words and symbols derived from keyboarding, dictation, or using voice recognition software. As a result, there may be errors in the script that have gone undetected. Please consider this when interpreting information found in this note.

## 2020-12-20 ENCOUNTER — HEALTH MAINTENANCE LETTER (OUTPATIENT)
Age: 20
End: 2020-12-20

## 2021-03-25 ENCOUNTER — PATIENT OUTREACH (OUTPATIENT)
Dept: FAMILY MEDICINE | Facility: OTHER | Age: 21
End: 2021-03-25

## 2021-03-25 NOTE — TELEPHONE ENCOUNTER
Patient Quality Outreach      Summary:    Patient has the following on her problem list/HM:     Depression / Dysthymia review    6 Month Remission: 4-8 month window range:   12 Month Remission: 10-14 month window range:        PHQ-9 SCORE 4/26/2019   PHQ-9 Total Score 6       If PHQ-9 recheck is 5 or more, route to provider for next steps.    Immunizations       Health Maintenance Due   Topic     Flu Vaccine (1)         Patient is due/failing the following:   Cervical Cancer Screening - PAP Needed, PHQ-9 Needed and Depression follow-up visit, Adult/Adolescent physical, date due: 2021, Chlamydia and Immunizations    Type of outreach:    Sent Webspy message.    Questions for provider review:    None                                                                                                                                     Harika Garcia NP on 3/25/2021 at 2:48 PM       Chart routed to N/A.

## 2021-04-18 ENCOUNTER — HEALTH MAINTENANCE LETTER (OUTPATIENT)
Age: 21
End: 2021-04-18

## 2021-10-03 ENCOUNTER — HEALTH MAINTENANCE LETTER (OUTPATIENT)
Age: 21
End: 2021-10-03

## 2022-03-16 NOTE — PLAN OF CARE
Assessments completed as charted. B/P: 153/117, T: 98, P: 74, R: 18. Rates pain: 0/10 . Garrison catheter in place and draining clear yellow urine. Fundus: Midline U/U. Lochia: Light. Activity: stand by assist of 1, wheel chair used for longer distances, encouraged to sit up at side of bed and take small walks in room with sba. Infant feeding: Formula.   Postpartum care education provided, see Patient Education activity. Patient denies needs. Will monitor.  Silvia Ahumada       45

## 2022-05-14 ENCOUNTER — HEALTH MAINTENANCE LETTER (OUTPATIENT)
Age: 22
End: 2022-05-14

## 2022-09-04 ENCOUNTER — HEALTH MAINTENANCE LETTER (OUTPATIENT)
Age: 22
End: 2022-09-04

## 2023-06-03 ENCOUNTER — HEALTH MAINTENANCE LETTER (OUTPATIENT)
Age: 23
End: 2023-06-03

## 2024-04-08 ENCOUNTER — TRANSFERRED RECORDS (OUTPATIENT)
Dept: HEALTH INFORMATION MANAGEMENT | Facility: HOSPITAL | Age: 24
End: 2024-04-08

## 2024-04-23 ENCOUNTER — TRANSFERRED RECORDS (OUTPATIENT)
Dept: HEALTH INFORMATION MANAGEMENT | Facility: CLINIC | Age: 24
End: 2024-04-23
Payer: COMMERCIAL

## 2024-04-25 ENCOUNTER — HOSPITAL ENCOUNTER (OUTPATIENT)
Facility: HOSPITAL | Age: 24
Discharge: HOME | End: 2024-04-25
Attending: FAMILY MEDICINE | Admitting: FAMILY MEDICINE
Payer: COMMERCIAL

## 2024-04-25 VITALS — OXYGEN SATURATION: 98 % | DIASTOLIC BLOOD PRESSURE: 76 MMHG | TEMPERATURE: 98.1 F | SYSTOLIC BLOOD PRESSURE: 119 MMHG

## 2024-04-25 PROBLEM — Z36.89 ENCOUNTER FOR TRIAGE IN PREGNANT PATIENT: Status: ACTIVE | Noted: 2024-04-25

## 2024-04-25 PROBLEM — O47.03 THREATENED PREMATURE LABOR IN THIRD TRIMESTER: Status: ACTIVE | Noted: 2024-04-25

## 2024-04-25 LAB
ALBUMIN UR-MCNC: NEGATIVE MG/DL
AMPHETAMINES UR QL SCN: ABNORMAL
APPEARANCE UR: ABNORMAL
BARBITURATES UR QL SCN: ABNORMAL
BENZODIAZ UR QL SCN: ABNORMAL
BILIRUB UR QL STRIP: NEGATIVE
BZE UR QL SCN: ABNORMAL
CANNABINOIDS UR QL SCN: ABNORMAL
COLOR UR AUTO: YELLOW
CREAT UR-MCNC: 119 MG/DL
FENTANYL UR QL: ABNORMAL
GLUCOSE UR STRIP-MCNC: NEGATIVE MG/DL
HGB UR QL STRIP: NEGATIVE
KETONES UR STRIP-MCNC: NEGATIVE MG/DL
LEUKOCYTE ESTERASE UR QL STRIP: ABNORMAL
MUCOUS THREADS #/AREA URNS LPF: PRESENT /LPF
NITRATE UR QL: NEGATIVE
OPIATES UR QL SCN: ABNORMAL
PCP QUAL URINE (ROCHE): ABNORMAL
PH UR STRIP: 5.5 [PH] (ref 4.7–8)
RBC URINE: 1 /HPF
SP GR UR STRIP: 1.02 (ref 1–1.03)
SQUAMOUS EPITHELIAL: 19 /HPF
UROBILINOGEN UR STRIP-MCNC: NORMAL MG/DL
WBC URINE: 12 /HPF

## 2024-04-25 PROCEDURE — 258N000003 HC RX IP 258 OP 636: Performed by: FAMILY MEDICINE

## 2024-04-25 PROCEDURE — 80307 DRUG TEST PRSMV CHEM ANLYZR: CPT | Performed by: FAMILY MEDICINE

## 2024-04-25 PROCEDURE — 81001 URINALYSIS AUTO W/SCOPE: CPT | Performed by: FAMILY MEDICINE

## 2024-04-25 PROCEDURE — 80349 CANNABINOIDS NATURAL: CPT | Performed by: FAMILY MEDICINE

## 2024-04-25 RX ORDER — LIDOCAINE 40 MG/G
CREAM TOPICAL
Status: DISCONTINUED | OUTPATIENT
Start: 2024-04-25 | End: 2024-04-25 | Stop reason: HOSPADM

## 2024-04-25 RX ADMIN — SODIUM CHLORIDE, POTASSIUM CHLORIDE, SODIUM LACTATE AND CALCIUM CHLORIDE 1000 ML: 600; 310; 30; 20 INJECTION, SOLUTION INTRAVENOUS at 17:04

## 2024-04-25 ASSESSMENT — ACTIVITIES OF DAILY LIVING (ADL)
ADLS_ACUITY_SCORE: 35

## 2024-04-25 NOTE — PLAN OF CARE
Shirley Daniels is a 23 year old  and 35w0d patient came in complaining of Rule Out Labor (Constant pain starting at 9am that went away and came back arounbd 1pm and is now intermittent.)    Patient Active Problem List   Diagnosis    Methamphetamine abuse (H)    Moderate single current episode of major depressive disorder (H)    Marijuana dependence (H)    Postpartum care following vaginal delivery    Encounter for triage in pregnant patient    Threatened premature labor in third trimester       Pt discharged to home at 6:45 PM and encouraged to rest and drink plenty of fluids.  Pt told to call/return if bleeding more than spotting, water breaks, contractions 10 minutes apart and getting stronger that she has to breath through.     Nursing education on when to return provided See AVS. Self-management instructions reviewed. AVS given and signed. All questions answered and patient verbalizes understanding.    /76   Temp 98.1  F (36.7  C) (Oral)   LMP 2023   SpO2 98%     Cervical status: fingertip dilated, thick, and effaced to 20%  Discharge support:  Family/Friend Support Emerson Carpio RN

## 2024-04-25 NOTE — PLAN OF CARE
*Obtained care of patient 1545    OB Triage Note  Shirley Daniels  MRN: [id number]  Gestational Age: 33      Shirley Daniels presents for Constant pain starting at 9am that went away and came back arounbd 1pm and is now intermittent.      Rates pain at 2/10.  Bleeding: None Denies LOF.     Dr. Gomez notified of arrival and condition.  Oriented patient to surroundings. Call light within reach.     Uterine Assessment:Contractions: frequency q 2-3 minutes of mild quality.    Plan:  -continue monitoring  - U/A and notify provider with results      Deya Carpio RN

## 2024-04-25 NOTE — H&P
[facility]    Consult Note  Obstetrics and Gynecology     Date of Admission:  2024  Date of Service (when I saw the patient): 24    Assessment & Plan   Shirley Daniels is a 23 year old female  who presents with painful cramping/contractions starting this morning and continued since 1pm.     Threatened  labor  -UA shows WBCs and mucus, as well as leukocyte esterase, but no bacteria.  -IVF bolus given (1L lactated ringers)  -Initial SVE: 0.5/20/-3, will recheck in 1 hour to see if any cervical change.    -Recheck 75 minutes later is unchanged and patient reports she isn't feeling contractions as much anymore after the IV fluids.   -Will discharge to home with strict return precautions.    Marijuana use in pregnancy  -UDS + for THC, counseled on cessation    Active Problems:    Encounter for triage in pregnant patient    Threatened premature labor in third trimester      Jessica Gomez MD    Primary Care Physician   Jorge L Peters    Reason for Consult   Reason for consult:  with contractions, on call for her regular OB provider    Chief Complaint   Patient presents reporting constant pelvic pain.    History is obtained from the patient.    History of Present Illness   Shirley Daniels is a 23 year old female  who presents with constant pelvic pain starting at 9am. It stopped for a while and then resumed intermittently since 1pm. Denies VB, LOF, and reports +FM.     Pregnancy history: History of prior  delivery. History of prior severe PIH, on ASA 81mg daily with this pregnancy. History of GDM with prior pregnancy. Failed 1 hr GTT this pregnancy, needs 3hr GTT. History of THC and methamphetamine use, as well as tobacco. Social: Does not have custody of two other children. On [employer] watchlist.     Prenatal Labs: O+, Rubella immune, GBS positive      Past Medical History    I have reviewed this patient's medical history and updated it with pertinent information if  needed.   Past Medical History:   Diagnosis Date    Anemia due to blood loss, acute, due to Vaginal Delivery, NOT complicated--5/12/2019 5/16/2019    Gestational diabetes mellitus (GDM) in third trimester 4/16/2019    Hypertension in pregnancy, preeclampsia, severe, postpartum condition--ECHO normal, EKG abnormal P waves, CT, Labetolol-Hydralazine--5/15/2019 5/16/2019    Preeclampsia, severe, third trimester--Magnesium---5/12/2019 5/16/2019    Ruptured ovarian cyst 2/10/2014    Ruptured ovarian cyst with hemoperitoneum and pelvic pain        Past Surgical History   I have reviewed this patient's surgical history and updated it with pertinent information if needed.  Past Surgical History:   Procedure Laterality Date    C ETONOGESTREL IMPLANT SYSTEM  5/12/2020         HC REMOVE TONSILS/ADENOIDS,<13 Y/O      SURGICAL PATHOLOGY EXAM      removal of papiloma       Prior to Admission Medications   Prior to Admission Medications   Prescriptions Last Dose Informant Patient Reported? Taking?   Prenatal Vit-Fe Fumarate-FA (PRENATAL VITAMIN PO)   Yes No   Sig: Take 1 tablet by mouth daily   ibuprofen (ADVIL/MOTRIN) 600 MG tablet   No No   Sig: Take 1 tablet (600 mg) by mouth every 6 hours as needed for moderate pain   propranolol (INDERAL) 10 MG tablet   Yes No   Sig: TAKE 1 TABLET BY MOUTH UP TO 3 TIMES PER DAY AS NEEDED FOR ANXIETY   sertraline (ZOLOFT) 100 MG tablet   Yes No   Sig: Take 100 mg by mouth daily      Facility-Administered Medications: None     Allergies   No Known Allergies    Social History   I have reviewed this patient's social history and updated it with pertinent information if needed. Shirley EVANS Frances  reports that she has never smoked. She has never used smokeless tobacco. She reports current drug use. She reports that she does not drink alcohol.    Family History   I have reviewed this patient's family history and updated it with pertinent information if needed.   Family History   Problem Relation Age  of Onset    Unknown/Adopted No family hx of     Asthma No family hx of     C.A.D. No family hx of     Diabetes No family hx of     Cerebrovascular Disease No family hx of     Hypertension No family hx of     Hypertension No family hx of     Cerebrovascular Disease No family hx of        Review of Systems   The 10 point Review of Systems is negative other than noted in the HPI or here.     Physical Exam   Temp: 98.1  F (36.7  C) Temp src: Oral BP: 119/76       SpO2: 98 %      Vital Signs with Ranges  Temp:  [98.1  F (36.7  C)] 98.1  F (36.7  C)  BP: (115-124)/(70-82) 119/76  SpO2:  [97 %-98 %] 98 %  0 lbs 0 oz  FHR: 130 bpm, moderate variability, + accels, no decels  TOCO: q2-3 minutes  SVE: 0.5 (fingertip)/20/-3, firm, posterior  Constitutional: awake, alert, cooperative, no apparent distress, and appears stated age  Respiratory: No increased work of breathing, good air exchange, clear to auscultation bilaterally, no crackles or wheezing  Cardiovascular: Normal apical impulse, regular rate and rhythm, normal S1 and S2, no S3 or S4, and no murmur noted  GI: No scars, normal bowel sounds, soft, non-distended, non-tender, no masses palpated, no hepatosplenomegally  Genitounirinary: External Genitalia:  General appearance; normal, Hair distribution; normal, Lesions absent  Skin/Extremities: no bruising or bleeding and no rashes  Musculoskeletal: There is no redness, warmth, or swelling of the joints.  Full range of motion noted.  Motor strength is 5 out of 5 all extremities bilaterally.  Tone is normal.  Neurologic: Awake, alert, oriented to name, place and time.  Cranial nerves II-XII are grossly intact.  Motor is 5 out of 5 bilaterally.  Cerebellar finger to nose, heel to shin intact.  Sensory is intact.  Babinski down going, Romberg negative, and gait is normal.  Neuropsychiatric: General: normal, calm, and normal eye contact  Affect: normal    Data   Results for orders placed or performed during the hospital  encounter of 04/25/24 (from the past 24 hour(s))   Urine Drug Screen    Narrative    The following orders were created for panel order Urine Drug Screen.  Procedure                               Abnormality         Status                     ---------                               -----------         ------                     Urine Drug Screen Panel[[id number]]      Abnormal            Final result                 Please view results for these tests on the individual orders.   Urine Drug Screen Panel   Result Value Ref Range    Amphetamines Urine Screen Negative Screen Negative    Barbituates Urine Screen Negative Screen Negative    Benzodiazepine Urine Screen Negative Screen Negative    Cannabinoids Urine Screen Positive (A) Screen Negative    Cocaine Urine Screen Negative Screen Negative    Fentanyl Qual Urine Screen Negative Screen Negative    Opiates Urine Screen Negative Screen Negative    PCP Urine Screen Negative Screen Negative   UA with Microscopic reflex to Culture    Specimen: Urine, Clean Catch   Result Value Ref Range    Color Urine Yellow Colorless, Straw, Light Yellow, Yellow    Appearance Urine Slightly Cloudy (A) Clear    Glucose Urine Negative Negative mg/dL    Bilirubin Urine Negative Negative    Ketones Urine Negative Negative mg/dL    Specific Gravity Urine 1.019 1.003 - 1.035    Blood Urine Negative Negative    pH Urine 5.5 4.7 - 8.0    Protein Albumin Urine Negative Negative mg/dL    Urobilinogen Urine Normal Normal, 2.0 mg/dL    Nitrite Urine Negative Negative    Leukocyte Esterase Urine Moderate (A) Negative    Mucus Urine Present (A) None Seen /LPF    RBC Urine 1 <=2 /HPF    WBC Urine 12 (H) <=5 /HPF    Squamous Epithelials Urine 19 (H) <=1 /HPF   Urine Creatinine for Drug Screen Panel   Result Value Ref Range    Creatinine Urine for Drug Screen 119 mg/dL

## 2024-04-25 NOTE — PROGRESS NOTES
35 week gestation patient arrived from home with complaint of pain to abd, comes and goes since 1 PM denies headaches blurry vision. Denies any pain or burning with voiding, denies any vaginal bleeding.  Placed on fetal monitor, noted fetal heart tones a category 1 tracing, VS WNL, except for heart rate, was 125-130, instructed to drink water, obtained UA and called Dr Gomez  with observations. Report to Deya LOPEZ

## 2024-04-25 NOTE — DISCHARGE INSTRUCTIONS
Discharge Instructions for Undelivered Patients    Diet:  * Drink 8 to 12 glasses of liquids (milk, juice, water) every day  * You may eat meals and snacks.    Activity:  * Count fetal kicks every day.  * Call your doctor if your baby is moving less than usual.    Call your provider if you notice:  * Swelling in your face or increased swelling in your hands or legs.  * Headaches that are not relieved by Tylenol (acetaminophen).  * Changes in your vision (blurring; seeing spots or stars).  * Nausea (sick to your stomach) and vomiting (throwing up).  * Weight gain of 5 pounds per week.  * Heartburn that doesn't go away.  * Signs of bladder infection: Pain when you urinate (use the toilet), needing to go more often or more urgently.  * The bag of santos (membrane) breaks, or you notice leaking in your underwear.  * Bright red blood in your underwear.  * Abdominal (lower belly) or stomach pain.  * Second (plus) baby: Contractions (tightenings) less than 10 minutes apart and getting stronger.  * Increase or change in vaginal discharge (note the color and amount).      [facility]: [phone]

## 2024-05-01 LAB
CANNABINOIDS UR CFM-MCNC: 191 NG/ML
CARBOXYTHC/CREAT UR: 161 NG/MG CREAT

## 2024-05-15 ENCOUNTER — TRANSFERRED RECORDS (OUTPATIENT)
Dept: HEALTH INFORMATION MANAGEMENT | Facility: HOSPITAL | Age: 24
End: 2024-05-15

## 2024-05-20 ENCOUNTER — HOSPITAL ENCOUNTER (OUTPATIENT)
Facility: HOSPITAL | Age: 24
Discharge: HOME | End: 2024-05-21
Attending: FAMILY MEDICINE | Admitting: FAMILY MEDICINE
Payer: COMMERCIAL

## 2024-05-20 LAB
AMPHETAMINES UR QL SCN: ABNORMAL
BARBITURATES UR QL SCN: ABNORMAL
BENZODIAZ UR QL SCN: ABNORMAL
BZE UR QL SCN: ABNORMAL
CANNABINOIDS UR QL SCN: ABNORMAL
CREAT UR-MCNC: 462 MG/DL
FENTANYL UR QL: ABNORMAL
OPIATES UR QL SCN: ABNORMAL
PCP QUAL URINE (ROCHE): ABNORMAL

## 2024-05-20 PROCEDURE — 80349 CANNABINOIDS NATURAL: CPT | Performed by: FAMILY MEDICINE

## 2024-05-20 PROCEDURE — 80307 DRUG TEST PRSMV CHEM ANLYZR: CPT | Performed by: FAMILY MEDICINE

## 2024-05-20 PROCEDURE — 80325 AMPHETAMINES 3OR 4: CPT | Performed by: FAMILY MEDICINE

## 2024-05-20 RX ORDER — ASPIRIN 81 MG/1
81 TABLET, CHEWABLE ORAL DAILY
Status: ON HOLD | COMMUNITY
End: 2024-06-03

## 2024-05-20 RX ORDER — LIDOCAINE 40 MG/G
CREAM TOPICAL
Status: DISCONTINUED | OUTPATIENT
Start: 2024-05-20 | End: 2024-05-21 | Stop reason: HOSPADM

## 2024-05-20 ASSESSMENT — ACTIVITIES OF DAILY LIVING (ADL): ADLS_ACUITY_SCORE: 35

## 2024-05-21 ENCOUNTER — HOSPITAL ENCOUNTER (OUTPATIENT)
Facility: HOSPITAL | Age: 24
End: 2024-05-21
Admitting: FAMILY MEDICINE
Payer: COMMERCIAL

## 2024-05-21 VITALS
RESPIRATION RATE: 18 BRPM | DIASTOLIC BLOOD PRESSURE: 75 MMHG | TEMPERATURE: 98.7 F | SYSTOLIC BLOOD PRESSURE: 125 MMHG | OXYGEN SATURATION: 96 %

## 2024-05-21 PROCEDURE — G0463 HOSPITAL OUTPT CLINIC VISIT: HCPCS

## 2024-05-21 NOTE — PROGRESS NOTES
Patient arrived from home with complaints of contractions started at 9 pm, placed on fetal monitor noted fetal heart tones are WNL  at 120-140 baby active,  noted contractions 2-3 min apart, over 1 hour noted contractions spaced out to 3-4 min apart, palpate moderate. Triage orders sent UDS sent, noted positive for amphetamine and cannabis, self reported cannabis. VS are WNL, noted left foot a +1 swelling, denies any headaches or visual spots, denies any bleeding  VE checked and then rechecked in 1 hour with no change noted.  Dr Gomez notified at 2245 of triage, and orders received. VS remain WNL, reviewed plan to discharge to home, copies of all reviewed sent home with patient. Released ambulatory to home, ambulated to front door copies of all reviewed sent home with patient.

## 2024-05-24 LAB
CANNABINOIDS UR CFM-MCNC: 409 NG/ML
CARBOXYTHC/CREAT UR: 89 NG/MG CREAT

## 2024-05-25 LAB
AMPHET UR-MCNC: 101 NG/ML
MDA UR-MCNC: <200 NG/ML
MDEA UR-MCNC: <200 NG/ML
MDMA UR-MCNC: <200 NG/ML
METHAMPHET UR-MCNC: 293 NG/ML
PHENTERMINE UR CFM-MCNC: <200 NG/ML

## 2024-05-30 RX ORDER — LOPERAMIDE HCL 2 MG
2 CAPSULE ORAL
Status: CANCELLED | OUTPATIENT
Start: 2024-05-30

## 2024-05-30 RX ORDER — OXYTOCIN/0.9 % SODIUM CHLORIDE 30/500 ML
100-340 PLASTIC BAG, INJECTION (ML) INTRAVENOUS CONTINUOUS PRN
Status: CANCELLED | OUTPATIENT
Start: 2024-05-30

## 2024-05-30 RX ORDER — CARBOPROST TROMETHAMINE 250 UG/ML
250 INJECTION, SOLUTION INTRAMUSCULAR
Status: CANCELLED | OUTPATIENT
Start: 2024-05-30

## 2024-05-30 RX ORDER — METHYLERGONOVINE MALEATE 0.2 MG/ML
200 INJECTION INTRAVENOUS
Status: CANCELLED | OUTPATIENT
Start: 2024-05-30

## 2024-05-30 RX ORDER — METOCLOPRAMIDE HYDROCHLORIDE 5 MG/ML
10 INJECTION INTRAMUSCULAR; INTRAVENOUS EVERY 6 HOURS PRN
Status: CANCELLED | OUTPATIENT
Start: 2024-05-30

## 2024-05-30 RX ORDER — KETOROLAC TROMETHAMINE 30 MG/ML
30 INJECTION, SOLUTION INTRAMUSCULAR; INTRAVENOUS
Status: CANCELLED | OUTPATIENT
Start: 2024-05-30

## 2024-05-30 RX ORDER — IBUPROFEN 200 MG
800 TABLET ORAL
Status: CANCELLED | OUTPATIENT
Start: 2024-05-30

## 2024-05-30 RX ORDER — OXYTOCIN/0.9 % SODIUM CHLORIDE 30/500 ML
340 PLASTIC BAG, INJECTION (ML) INTRAVENOUS CONTINUOUS PRN
Status: CANCELLED | OUTPATIENT
Start: 2024-05-30

## 2024-05-30 RX ORDER — MISOPROSTOL 200 UG/1
800 TABLET ORAL
Status: CANCELLED | OUTPATIENT
Start: 2024-05-30

## 2024-05-30 RX ORDER — LOPERAMIDE HCL 2 MG
4 CAPSULE ORAL
Status: CANCELLED | OUTPATIENT
Start: 2024-05-30

## 2024-05-30 RX ORDER — NALOXONE HYDROCHLORIDE 0.4 MG/ML
0.2 INJECTION, SOLUTION INTRAMUSCULAR; INTRAVENOUS; SUBCUTANEOUS
Status: CANCELLED | OUTPATIENT
Start: 2024-05-30

## 2024-05-30 RX ORDER — CITRIC ACID/SODIUM CITRATE 334-500MG
30 SOLUTION, ORAL ORAL
Status: CANCELLED | OUTPATIENT
Start: 2024-05-30

## 2024-05-30 RX ORDER — OXYTOCIN 10 [USP'U]/ML
10 INJECTION, SOLUTION INTRAMUSCULAR; INTRAVENOUS
Status: CANCELLED | OUTPATIENT
Start: 2024-05-30

## 2024-05-30 RX ORDER — MISOPROSTOL 100 UG/1
400 TABLET ORAL
Status: CANCELLED | OUTPATIENT
Start: 2024-05-30

## 2024-05-30 RX ORDER — FENTANYL CITRATE 50 UG/ML
100 INJECTION, SOLUTION INTRAMUSCULAR; INTRAVENOUS
Status: CANCELLED | OUTPATIENT
Start: 2024-05-30

## 2024-05-30 RX ORDER — LIDOCAINE 40 MG/G
CREAM TOPICAL
Status: CANCELLED | OUTPATIENT
Start: 2024-05-30

## 2024-05-30 RX ORDER — METOCLOPRAMIDE 5 MG/1
10 TABLET ORAL EVERY 6 HOURS PRN
Status: CANCELLED | OUTPATIENT
Start: 2024-05-30

## 2024-05-30 RX ORDER — NALOXONE HYDROCHLORIDE 0.4 MG/ML
0.4 INJECTION, SOLUTION INTRAMUSCULAR; INTRAVENOUS; SUBCUTANEOUS
Status: CANCELLED | OUTPATIENT
Start: 2024-05-30

## 2024-05-31 ENCOUNTER — HOSPITAL ENCOUNTER (INPATIENT)
Facility: HOSPITAL | Age: 24
LOS: 3 days | Discharge: HOME | End: 2024-06-03
Attending: FAMILY MEDICINE | Admitting: FAMILY MEDICINE
Payer: COMMERCIAL

## 2024-05-31 ENCOUNTER — ANESTHESIA (OUTPATIENT)
Dept: OBGYN | Facility: HOSPITAL | Age: 24
End: 2024-05-31
Payer: COMMERCIAL

## 2024-05-31 ENCOUNTER — ANESTHESIA EVENT (OUTPATIENT)
Dept: OBGYN | Facility: HOSPITAL | Age: 24
End: 2024-05-31
Payer: COMMERCIAL

## 2024-05-31 PROBLEM — Z34.90 ENCOUNTER FOR INDUCTION OF LABOR: Status: ACTIVE | Noted: 2024-05-31

## 2024-05-31 PROBLEM — K02.9 DENTAL CARIES: Status: ACTIVE | Noted: 2023-11-06

## 2024-05-31 PROBLEM — F15.10 METHAMPHETAMINE ABUSE (H): Status: ACTIVE | Noted: 2018-01-30

## 2024-05-31 LAB
ABO/RH(D): NORMAL
ALBUMIN MFR UR ELPH: 67.8 MG/DL
ALBUMIN SERPL BCG-MCNC: 3.1 G/DL (ref 3.5–5.2)
ALP SERPL-CCNC: 281 U/L (ref 40–150)
ALT SERPL W P-5'-P-CCNC: 20 U/L (ref 0–50)
AMPHETAMINES UR QL SCN: ABNORMAL
ANION GAP SERPL CALCULATED.3IONS-SCNC: 10 MMOL/L (ref 7–15)
ANTIBODY SCREEN: NEGATIVE
AST SERPL W P-5'-P-CCNC: 27 U/L (ref 0–45)
BARBITURATES UR QL SCN: ABNORMAL
BENZODIAZ UR QL SCN: ABNORMAL
BILIRUB SERPL-MCNC: 0.3 MG/DL
BUN SERPL-MCNC: 7.5 MG/DL (ref 6–20)
BZE UR QL SCN: ABNORMAL
CALCIUM SERPL-MCNC: 8.8 MG/DL (ref 8.6–10)
CANNABINOIDS UR QL SCN: ABNORMAL
CHLORIDE SERPL-SCNC: 107 MMOL/L (ref 98–107)
CREAT SERPL-MCNC: 0.7 MG/DL (ref 0.51–0.95)
CREAT UR-MCNC: 319 MG/DL
CREAT UR-MCNC: 319.5 MG/DL
DEPRECATED HCO3 PLAS-SCNC: 19 MMOL/L (ref 22–29)
EGFRCR SERPLBLD CKD-EPI 2021: >90 ML/MIN/1.73M2
ERYTHROCYTE [DISTWIDTH] IN BLOOD BY AUTOMATED COUNT: 14.8 % (ref 10–15)
EST. AVERAGE GLUCOSE BLD GHB EST-MCNC: 103 MG/DL
FENTANYL UR QL: ABNORMAL
GLUCOSE SERPL-MCNC: 117 MG/DL (ref 70–99)
HBA1C MFR BLD: 5.2 %
HCT VFR BLD AUTO: 35.9 % (ref 35–47)
HGB BLD-MCNC: 12.3 G/DL (ref 11.7–15.7)
HIV 1+2 AB+HIV1P24 AG SERPLBLD IA.RAPID: NON REACTIVE
HIV 1+2 AB+HIV1P24 AG SERPLBLD IA.RAPID: NON REACTIVE
HIV INTERPRETATION: NORMAL
HOLD SPECIMEN: NORMAL
MCH RBC QN AUTO: 29.7 PG (ref 26.5–33)
MCHC RBC AUTO-ENTMCNC: 34.3 G/DL (ref 31.5–36.5)
MCV RBC AUTO: 87 FL (ref 78–100)
OPIATES UR QL SCN: ABNORMAL
PCP QUAL URINE (ROCHE): ABNORMAL
PLATELET # BLD AUTO: 281 10E3/UL (ref 150–450)
POTASSIUM SERPL-SCNC: 3.5 MMOL/L (ref 3.4–5.3)
PROT SERPL-MCNC: 5.9 G/DL (ref 6.4–8.3)
PROT/CREAT 24H UR: 0.21 MG/MG CR (ref 0–0.2)
RBC # BLD AUTO: 4.14 10E6/UL (ref 3.8–5.2)
SODIUM SERPL-SCNC: 136 MMOL/L (ref 135–145)
SPECIMEN EXPIRATION DATE: NORMAL
WBC # BLD AUTO: 8.6 10E3/UL (ref 4–11)

## 2024-05-31 PROCEDURE — 250N000011 HC RX IP 250 OP 636: Performed by: NURSE ANESTHETIST, CERTIFIED REGISTERED

## 2024-05-31 PROCEDURE — 86803 HEPATITIS C AB TEST: CPT | Performed by: FAMILY MEDICINE

## 2024-05-31 PROCEDURE — 10907ZC DRAINAGE OF AMNIOTIC FLUID, THERAPEUTIC FROM PRODUCTS OF CONCEPTION, VIA NATURAL OR ARTIFICIAL OPENING: ICD-10-PCS | Performed by: FAMILY MEDICINE

## 2024-05-31 PROCEDURE — 36415 COLL VENOUS BLD VENIPUNCTURE: CPT | Performed by: FAMILY MEDICINE

## 2024-05-31 PROCEDURE — 84156 ASSAY OF PROTEIN URINE: CPT | Performed by: FAMILY MEDICINE

## 2024-05-31 PROCEDURE — 85049 AUTOMATED PLATELET COUNT: CPT | Performed by: FAMILY MEDICINE

## 2024-05-31 PROCEDURE — 120N000001 HC R&B MED SURG/OB

## 2024-05-31 PROCEDURE — 80053 COMPREHEN METABOLIC PANEL: CPT | Performed by: FAMILY MEDICINE

## 2024-05-31 PROCEDURE — 722N000001 HC LABOR CARE VAGINAL DELIVERY SINGLE

## 2024-05-31 PROCEDURE — 87389 HIV-1 AG W/HIV-1&-2 AB AG IA: CPT | Performed by: FAMILY MEDICINE

## 2024-05-31 PROCEDURE — 87806 HIV AG W/HIV1&2 ANTB W/OPTIC: CPT | Performed by: FAMILY MEDICINE

## 2024-05-31 PROCEDURE — 87340 HEPATITIS B SURFACE AG IA: CPT | Performed by: FAMILY MEDICINE

## 2024-05-31 PROCEDURE — 258N000003 HC RX IP 258 OP 636: Performed by: NURSE ANESTHETIST, CERTIFIED REGISTERED

## 2024-05-31 PROCEDURE — 80349 CANNABINOIDS NATURAL: CPT | Performed by: FAMILY MEDICINE

## 2024-05-31 PROCEDURE — 80307 DRUG TEST PRSMV CHEM ANLYZR: CPT | Performed by: FAMILY MEDICINE

## 2024-05-31 PROCEDURE — 59400 OBSTETRICAL CARE: CPT | Performed by: NURSE ANESTHETIST, CERTIFIED REGISTERED

## 2024-05-31 PROCEDURE — 00HU33Z INSERTION OF INFUSION DEVICE INTO SPINAL CANAL, PERCUTANEOUS APPROACH: ICD-10-PCS | Performed by: FAMILY MEDICINE

## 2024-05-31 PROCEDURE — 250N000013 HC RX MED GY IP 250 OP 250 PS 637: Performed by: FAMILY MEDICINE

## 2024-05-31 PROCEDURE — 99140 ANES COMP EMERGENCY COND: CPT | Performed by: NURSE ANESTHETIST, CERTIFIED REGISTERED

## 2024-05-31 PROCEDURE — 258N000003 HC RX IP 258 OP 636: Performed by: FAMILY MEDICINE

## 2024-05-31 PROCEDURE — 250N000009 HC RX 250: Performed by: FAMILY MEDICINE

## 2024-05-31 PROCEDURE — 83036 HEMOGLOBIN GLYCOSYLATED A1C: CPT | Performed by: FAMILY MEDICINE

## 2024-05-31 PROCEDURE — 250N000011 HC RX IP 250 OP 636: Performed by: FAMILY MEDICINE

## 2024-05-31 PROCEDURE — 86900 BLOOD TYPING SEROLOGIC ABO: CPT | Performed by: FAMILY MEDICINE

## 2024-05-31 PROCEDURE — 86780 TREPONEMA PALLIDUM: CPT | Performed by: FAMILY MEDICINE

## 2024-05-31 RX ORDER — LIDOCAINE 40 MG/G
CREAM TOPICAL
Status: DISCONTINUED | OUTPATIENT
Start: 2024-05-31 | End: 2024-05-31

## 2024-05-31 RX ORDER — NALOXONE HYDROCHLORIDE 0.4 MG/ML
0.4 INJECTION, SOLUTION INTRAMUSCULAR; INTRAVENOUS; SUBCUTANEOUS
Status: DISCONTINUED | OUTPATIENT
Start: 2024-05-31 | End: 2024-05-31 | Stop reason: HOSPADM

## 2024-05-31 RX ORDER — IBUPROFEN 800 MG/1
800 TABLET, FILM COATED ORAL
Status: COMPLETED | OUTPATIENT
Start: 2024-05-31 | End: 2024-05-31

## 2024-05-31 RX ORDER — OXYTOCIN 10 [USP'U]/ML
10 INJECTION, SOLUTION INTRAMUSCULAR; INTRAVENOUS
Status: DISCONTINUED | OUTPATIENT
Start: 2024-05-31 | End: 2024-06-03 | Stop reason: HOSPADM

## 2024-05-31 RX ORDER — METHYLERGONOVINE MALEATE 0.2 MG/ML
200 INJECTION INTRAVENOUS
Status: DISCONTINUED | OUTPATIENT
Start: 2024-05-31 | End: 2024-05-31 | Stop reason: HOSPADM

## 2024-05-31 RX ORDER — TRANEXAMIC ACID 10 MG/ML
1 INJECTION, SOLUTION INTRAVENOUS EVERY 30 MIN PRN
Status: DISCONTINUED | OUTPATIENT
Start: 2024-05-31 | End: 2024-06-03 | Stop reason: HOSPADM

## 2024-05-31 RX ORDER — FENTANYL CITRATE 50 UG/ML
25 INJECTION, SOLUTION INTRAMUSCULAR; INTRAVENOUS ONCE
Status: DISCONTINUED | OUTPATIENT
Start: 2024-05-31 | End: 2024-05-31

## 2024-05-31 RX ORDER — KETOROLAC TROMETHAMINE 30 MG/ML
30 INJECTION, SOLUTION INTRAMUSCULAR; INTRAVENOUS
Status: COMPLETED | OUTPATIENT
Start: 2024-05-31 | End: 2024-05-31

## 2024-05-31 RX ORDER — FENTANYL CITRATE-0.9 % NACL/PF 10 MCG/ML
100 PLASTIC BAG, INJECTION (ML) INTRAVENOUS EVERY 5 MIN PRN
Status: DISCONTINUED | OUTPATIENT
Start: 2024-05-31 | End: 2024-05-31 | Stop reason: HOSPADM

## 2024-05-31 RX ORDER — OXYTOCIN/0.9 % SODIUM CHLORIDE 30/500 ML
340 PLASTIC BAG, INJECTION (ML) INTRAVENOUS CONTINUOUS PRN
Status: DISCONTINUED | OUTPATIENT
Start: 2024-05-31 | End: 2024-05-31 | Stop reason: HOSPADM

## 2024-05-31 RX ORDER — BUPIVACAINE HYDROCHLORIDE 2.5 MG/ML
INJECTION, SOLUTION EPIDURAL; INFILTRATION; INTRACAUDAL
Status: DISPENSED
Start: 2024-05-31 | End: 2024-06-01

## 2024-05-31 RX ORDER — METOCLOPRAMIDE 10 MG/1
10 TABLET ORAL EVERY 6 HOURS PRN
Status: DISCONTINUED | OUTPATIENT
Start: 2024-05-31 | End: 2024-05-31 | Stop reason: HOSPADM

## 2024-05-31 RX ORDER — MISOPROSTOL 200 UG/1
400 TABLET ORAL
Status: DISCONTINUED | OUTPATIENT
Start: 2024-05-31 | End: 2024-06-03 | Stop reason: HOSPADM

## 2024-05-31 RX ORDER — HYDRALAZINE HYDROCHLORIDE 20 MG/ML
10 INJECTION INTRAMUSCULAR; INTRAVENOUS
Status: DISCONTINUED | OUTPATIENT
Start: 2024-05-31 | End: 2024-06-03 | Stop reason: HOSPADM

## 2024-05-31 RX ORDER — NALOXONE HYDROCHLORIDE 0.4 MG/ML
0.2 INJECTION, SOLUTION INTRAMUSCULAR; INTRAVENOUS; SUBCUTANEOUS
Status: DISCONTINUED | OUTPATIENT
Start: 2024-05-31 | End: 2024-05-31 | Stop reason: HOSPADM

## 2024-05-31 RX ORDER — OXYTOCIN 10 [USP'U]/ML
10 INJECTION, SOLUTION INTRAMUSCULAR; INTRAVENOUS
Status: DISCONTINUED | OUTPATIENT
Start: 2024-05-31 | End: 2024-05-31 | Stop reason: HOSPADM

## 2024-05-31 RX ORDER — LABETALOL 20 MG/4 ML (5 MG/ML) INTRAVENOUS SYRINGE
20-80 EVERY 10 MIN PRN
Status: DISCONTINUED | OUTPATIENT
Start: 2024-05-31 | End: 2024-06-03 | Stop reason: HOSPADM

## 2024-05-31 RX ORDER — NALBUPHINE HYDROCHLORIDE 20 MG/ML
2.5-5 INJECTION, SOLUTION INTRAMUSCULAR; INTRAVENOUS; SUBCUTANEOUS EVERY 6 HOURS PRN
Status: DISCONTINUED | OUTPATIENT
Start: 2024-05-31 | End: 2024-05-31

## 2024-05-31 RX ORDER — BUPIVACAINE HYDROCHLORIDE 2.5 MG/ML
INJECTION, SOLUTION EPIDURAL; INFILTRATION; INTRACAUDAL
Status: COMPLETED
Start: 2024-05-31 | End: 2024-05-31

## 2024-05-31 RX ORDER — MODIFIED LANOLIN
OINTMENT (GRAM) TOPICAL
Status: DISCONTINUED | OUTPATIENT
Start: 2024-05-31 | End: 2024-06-03 | Stop reason: HOSPADM

## 2024-05-31 RX ORDER — ONDANSETRON 2 MG/ML
4 INJECTION INTRAMUSCULAR; INTRAVENOUS EVERY 6 HOURS PRN
Status: DISCONTINUED | OUTPATIENT
Start: 2024-05-31 | End: 2024-05-31 | Stop reason: HOSPADM

## 2024-05-31 RX ORDER — METHYLERGONOVINE MALEATE 0.2 MG/ML
200 INJECTION INTRAVENOUS
Status: DISCONTINUED | OUTPATIENT
Start: 2024-05-31 | End: 2024-06-03 | Stop reason: HOSPADM

## 2024-05-31 RX ORDER — CARBOPROST TROMETHAMINE 250 UG/ML
250 INJECTION, SOLUTION INTRAMUSCULAR
Status: DISCONTINUED | OUTPATIENT
Start: 2024-05-31 | End: 2024-05-31 | Stop reason: HOSPADM

## 2024-05-31 RX ORDER — LIDOCAINE 40 MG/G
CREAM TOPICAL
Status: DISCONTINUED | OUTPATIENT
Start: 2024-05-31 | End: 2024-05-31 | Stop reason: HOSPADM

## 2024-05-31 RX ORDER — HYDROCORTISONE 25 MG/G
CREAM TOPICAL 3 TIMES DAILY PRN
Status: DISCONTINUED | OUTPATIENT
Start: 2024-05-31 | End: 2024-06-03 | Stop reason: HOSPADM

## 2024-05-31 RX ORDER — LOPERAMIDE HCL 2 MG
4 CAPSULE ORAL
Status: DISCONTINUED | OUTPATIENT
Start: 2024-05-31 | End: 2024-06-03 | Stop reason: HOSPADM

## 2024-05-31 RX ORDER — OXYTOCIN/0.9 % SODIUM CHLORIDE 30/500 ML
1-24 PLASTIC BAG, INJECTION (ML) INTRAVENOUS CONTINUOUS
Status: DISCONTINUED | OUTPATIENT
Start: 2024-05-31 | End: 2024-05-31 | Stop reason: HOSPADM

## 2024-05-31 RX ORDER — SODIUM CHLORIDE, SODIUM LACTATE, POTASSIUM CHLORIDE, CALCIUM CHLORIDE 600; 310; 30; 20 MG/100ML; MG/100ML; MG/100ML; MG/100ML
INJECTION, SOLUTION INTRAVENOUS CONTINUOUS PRN
Status: DISCONTINUED | OUTPATIENT
Start: 2024-05-31 | End: 2024-05-31 | Stop reason: HOSPADM

## 2024-05-31 RX ORDER — TRANEXAMIC ACID 10 MG/ML
1 INJECTION, SOLUTION INTRAVENOUS EVERY 30 MIN PRN
Status: DISCONTINUED | OUTPATIENT
Start: 2024-05-31 | End: 2024-05-31 | Stop reason: HOSPADM

## 2024-05-31 RX ORDER — LOPERAMIDE HCL 2 MG
4 CAPSULE ORAL
Status: DISCONTINUED | OUTPATIENT
Start: 2024-05-31 | End: 2024-05-31 | Stop reason: HOSPADM

## 2024-05-31 RX ORDER — CITRIC ACID/SODIUM CITRATE 334-500MG
30 SOLUTION, ORAL ORAL
Status: DISCONTINUED | OUTPATIENT
Start: 2024-05-31 | End: 2024-05-31 | Stop reason: HOSPADM

## 2024-05-31 RX ORDER — SODIUM CHLORIDE, SODIUM LACTATE, POTASSIUM CHLORIDE, CALCIUM CHLORIDE 600; 310; 30; 20 MG/100ML; MG/100ML; MG/100ML; MG/100ML
10-125 INJECTION, SOLUTION INTRAVENOUS CONTINUOUS
Status: DISCONTINUED | OUTPATIENT
Start: 2024-05-31 | End: 2024-06-03 | Stop reason: HOSPADM

## 2024-05-31 RX ORDER — FENTANYL CITRATE 50 UG/ML
INJECTION, SOLUTION INTRAMUSCULAR; INTRAVENOUS
Status: COMPLETED | OUTPATIENT
Start: 2024-05-31 | End: 2024-05-31

## 2024-05-31 RX ORDER — FENTANYL CITRATE-0.9 % NACL/PF 10 MCG/ML
100 PLASTIC BAG, INJECTION (ML) INTRAVENOUS EVERY 5 MIN PRN
Status: DISCONTINUED | OUTPATIENT
Start: 2024-05-31 | End: 2024-05-31

## 2024-05-31 RX ORDER — VITAMIN A ACETATE, .BETA.-CAROTENE, ASCORBIC ACID, CHOLECALCIFEROL, .ALPHA.-TOCOPHEROL ACETATE, DL-, THIAMINE MONONITRATE, RIBOFLAVIN, NIACINAMIDE, PYRIDOXINE HYDROCHLORIDE, FOLIC ACID, CYANOCOBALAMIN, CALCIUM CARBONATE, FERROUS FUMARATE, ZINC OXIDE, AND CUPRIC OXIDE 2000; 2000; 120; 400; 22; 1.84; 3; 20; 10; 1; 12; 200; 27; 25; 2 [IU]/1; [IU]/1; MG/1; [IU]/1; MG/1; MG/1; MG/1; MG/1; MG/1; MG/1; UG/1; MG/1; MG/1; MG/1; MG/1
1 TABLET ORAL DAILY
Status: DISCONTINUED | OUTPATIENT
Start: 2024-05-31 | End: 2024-06-03 | Stop reason: HOSPADM

## 2024-05-31 RX ORDER — OXYTOCIN/0.9 % SODIUM CHLORIDE 30/500 ML
100-340 PLASTIC BAG, INJECTION (ML) INTRAVENOUS CONTINUOUS PRN
Status: DISCONTINUED | OUTPATIENT
Start: 2024-05-31 | End: 2024-06-03 | Stop reason: HOSPADM

## 2024-05-31 RX ORDER — DOCUSATE SODIUM 100 MG/1
100 CAPSULE, LIQUID FILLED ORAL DAILY
Status: DISCONTINUED | OUTPATIENT
Start: 2024-05-31 | End: 2024-06-03 | Stop reason: HOSPADM

## 2024-05-31 RX ORDER — BUPIVACAINE HYDROCHLORIDE 2.5 MG/ML
INJECTION, SOLUTION EPIDURAL; INFILTRATION; INTRACAUDAL
Status: COMPLETED | OUTPATIENT
Start: 2024-05-31 | End: 2024-05-31

## 2024-05-31 RX ORDER — CARBOPROST TROMETHAMINE 250 UG/ML
250 INJECTION, SOLUTION INTRAMUSCULAR
Status: DISCONTINUED | OUTPATIENT
Start: 2024-05-31 | End: 2024-06-03 | Stop reason: HOSPADM

## 2024-05-31 RX ORDER — LOPERAMIDE HCL 2 MG
2 CAPSULE ORAL
Status: DISCONTINUED | OUTPATIENT
Start: 2024-05-31 | End: 2024-06-03 | Stop reason: HOSPADM

## 2024-05-31 RX ORDER — ONDANSETRON 4 MG/1
4 TABLET, ORALLY DISINTEGRATING ORAL EVERY 6 HOURS PRN
Status: DISCONTINUED | OUTPATIENT
Start: 2024-05-31 | End: 2024-05-31

## 2024-05-31 RX ORDER — FENTANYL CITRATE 50 UG/ML
100 INJECTION, SOLUTION INTRAMUSCULAR; INTRAVENOUS
Status: DISCONTINUED | OUTPATIENT
Start: 2024-05-31 | End: 2024-05-31

## 2024-05-31 RX ORDER — METOCLOPRAMIDE HYDROCHLORIDE 5 MG/ML
10 INJECTION INTRAMUSCULAR; INTRAVENOUS EVERY 6 HOURS PRN
Status: DISCONTINUED | OUTPATIENT
Start: 2024-05-31 | End: 2024-05-31 | Stop reason: HOSPADM

## 2024-05-31 RX ORDER — ACETAMINOPHEN 325 MG/1
650 TABLET ORAL EVERY 4 HOURS PRN
Status: DISCONTINUED | OUTPATIENT
Start: 2024-05-31 | End: 2024-06-03 | Stop reason: HOSPADM

## 2024-05-31 RX ORDER — ONDANSETRON 2 MG/ML
4 INJECTION INTRAMUSCULAR; INTRAVENOUS EVERY 6 HOURS PRN
Status: DISCONTINUED | OUTPATIENT
Start: 2024-05-31 | End: 2024-05-31

## 2024-05-31 RX ORDER — OXYTOCIN/0.9 % SODIUM CHLORIDE 30/500 ML
340 PLASTIC BAG, INJECTION (ML) INTRAVENOUS CONTINUOUS PRN
Status: DISCONTINUED | OUTPATIENT
Start: 2024-05-31 | End: 2024-06-03 | Stop reason: HOSPADM

## 2024-05-31 RX ORDER — LOPERAMIDE HCL 2 MG
2 CAPSULE ORAL
Status: DISCONTINUED | OUTPATIENT
Start: 2024-05-31 | End: 2024-05-31 | Stop reason: HOSPADM

## 2024-05-31 RX ORDER — MISOPROSTOL 200 UG/1
400 TABLET ORAL
Status: DISCONTINUED | OUTPATIENT
Start: 2024-05-31 | End: 2024-05-31 | Stop reason: HOSPADM

## 2024-05-31 RX ORDER — IBUPROFEN 800 MG/1
800 TABLET, FILM COATED ORAL EVERY 6 HOURS PRN
Status: DISCONTINUED | OUTPATIENT
Start: 2024-06-01 | End: 2024-06-03 | Stop reason: HOSPADM

## 2024-05-31 RX ORDER — ONDANSETRON 4 MG/1
4 TABLET, ORALLY DISINTEGRATING ORAL EVERY 6 HOURS PRN
Status: DISCONTINUED | OUTPATIENT
Start: 2024-05-31 | End: 2024-05-31 | Stop reason: HOSPADM

## 2024-05-31 RX ADMIN — FENTANYL CITRATE 25 MCG: 50 INJECTION INTRAMUSCULAR; INTRAVENOUS at 17:43

## 2024-05-31 RX ADMIN — Medication: at 23:55

## 2024-05-31 RX ADMIN — MISOPROSTOL 40 MCG: 200 TABLET ORAL at 11:25

## 2024-05-31 RX ADMIN — Medication 340 ML/HR: at 20:27

## 2024-05-31 RX ADMIN — IBUPROFEN 800 MG: 800 TABLET ORAL at 20:28

## 2024-05-31 RX ADMIN — SODIUM CHLORIDE, POTASSIUM CHLORIDE, SODIUM LACTATE AND CALCIUM CHLORIDE 500 ML: 600; 310; 30; 20 INJECTION, SOLUTION INTRAVENOUS at 20:10

## 2024-05-31 RX ADMIN — DOCUSATE SODIUM 100 MG: 100 CAPSULE, LIQUID FILLED ORAL at 23:55

## 2024-05-31 RX ADMIN — BUPIVACAINE HYDROCHLORIDE 1.25 ML: 2.5 INJECTION, SOLUTION EPIDURAL; INFILTRATION; INTRACAUDAL at 17:43

## 2024-05-31 RX ADMIN — SODIUM CHLORIDE, POTASSIUM CHLORIDE, SODIUM LACTATE AND CALCIUM CHLORIDE 500 ML: 600; 310; 30; 20 INJECTION, SOLUTION INTRAVENOUS at 16:48

## 2024-05-31 RX ADMIN — MISOPROSTOL 20 MCG: 200 TABLET ORAL at 10:22

## 2024-05-31 RX ADMIN — FENTANYL CITRATE 100 MCG: 50 INJECTION INTRAMUSCULAR; INTRAVENOUS at 15:11

## 2024-05-31 RX ADMIN — Medication 2 MILLI-UNITS/MIN: at 19:45

## 2024-05-31 RX ADMIN — MISOPROSTOL 20 MCG: 200 TABLET ORAL at 08:57

## 2024-05-31 ASSESSMENT — ACTIVITIES OF DAILY LIVING (ADL)
ADLS_ACUITY_SCORE: 20
ADLS_ACUITY_SCORE: 35
ADLS_ACUITY_SCORE: 20
ADLS_ACUITY_SCORE: 20

## 2024-05-31 NOTE — PLAN OF CARE
Pt's BP elevated on admission.  Pt stated she if nervous.  Pitting edema noted to BLE.  Clonus noted bilaterally.  Pt denies having any headache, visual changes or RUQ pain.  Dr Peters on the Unit and updated.  She stated we will collect a urine protein.  Pt updated.

## 2024-05-31 NOTE — H&P
No significant change in general health status based on examination of the patient, review of Nursing Admission Database and prenatal record. O+, GBS-. Cervical ripening and induction for ongoing methamphetamine use during pregnancy. Did not pass her one hour GTT, but didn't come back for testing. Hx PIH with her first pregnancy.    Shirley has had erratic prenatal care. She tested positive for amphetamines early in pregnancy. Despite denying ongoing methamphetamine use, tested positive two weeks ago during a triage visit. Ongoing marijuana use despite discussion of concern for fetal well being. Has worked with public health nursing erratically, has refused  or chem dependency help. At last visit mentioned that if she used methamphetamines, she would go into labor. This is the longest that she has been pregnant. Went early with her boys.    She does not have custody of her two boys. Child protection involved and wishes to be called on admission.     Pros and cons of induction discussed, including the risk of failed induction. I feel that the risks of her continuing the pregnancy, including the risk of stillbirth, are increased. She would like to proceed. She really wants to be done being pregnant.     /94   Temp 97.8  F (36.6  C) (Oral)   Resp 20   LMP 08/24/2023   SpO2 98%  Baby cephalic. Cervix soft but closed. -1 station.  1+ pedal edema-same as in clinic. Poor dentition.    A/P:  Methamphetamine, tobacco and marijuana use during pregnancy. Feel increased risk in continuing her pregnancy. Will undertake induction.    Hx PIH. Serial BP's she's coming down. I think she is just nervous, but will check urine protein.    Failed GTT-check A1C to get better sense of where her sugars have been running.      EFW: 7lb

## 2024-05-31 NOTE — ANESTHESIA PROCEDURE NOTES
"Intrathecal injection Procedure Note    Pre-Procedure   Staff -        CRNA: Ileana Bird APRN CRNA       Performed By: CRNA       Location: OR       Procedure Start/Stop Times: 5/31/2024 5:40 PM and 5/31/2024 5:43 PM       Pre-Anesthestic Checklist: patient identified, IV checked, risks and benefits discussed, informed consent, monitors and equipment checked, pre-op evaluation, at physician/surgeon's request and post-op pain management  Timeout:       Correct Patient: Yes        Correct Procedure: Yes        Correct Site: Yes        Correct Position: Yes   Procedure Documentation  Procedure: intrathecal injection       Diagnosis: labor pain       Patient Position: sitting       Patient Prep/Sterile Barriers: sterile gloves, mask, patient draped       Skin prep: Betadine and Chloraprep       Insertion Site: L3-4. (midline approach).       Needle Gauge: 25.        Needle Length (Inches): 3.5        Spinal Needle Type: En tip       Introducer used       # of attempts: 1 and  # of redirects:  1    Assessment/Narrative         Paresthesias: No.       CSF fluid: clear.    Medication(s) Administered   0.25% PF Bupivacaine (Intrathecal) - Intrathecal   1.25 mL - 5/31/2024 5:43:00 PM  Fentanyl PF (Intrathecal) - Intrathecal   25 mcg - 5/31/2024 5:43:00 PM  Medication Administration Time: 5/31/2024 5:40 PM      FOR [facility] ONLY:   Pain Team Contact information: please page the Pain Team Via [employer]. Search \"Pain\". During daytime hours, please page the attending first. At night please page the resident first.      "

## 2024-05-31 NOTE — PROGRESS NOTES
Minimally elevated Protein/Cr ratio at 0.21. A1C 5.2. Positive for THC, but not methamphetamine. FHT category 1.    Talked to child protection. Plan is to put baby on a police hold when born. I shared this with Shirley. She is sad, but not surprised. Reiterates that she is no longer using methamphetamine.

## 2024-05-31 NOTE — PROGRESS NOTES
Dr. Peters called and updated on pt status and discussed next dose of Cytotec with pt contraction pattern. MD stated she is coming in to assess pt and will discuss plan of care after assessing pt.

## 2024-05-31 NOTE — PROGRESS NOTES
Did not require 60 mcg dose of misoprostol. Having strong contractions every 2-3 minutes. Last /89. FHT categry 1. Cervix 3 cm, 50% effaced, 0 station. Tolerating labor well.

## 2024-05-31 NOTE — PROGRESS NOTES
Straight cath per Dr. Peters, immeasurable scant amount of urine removed. Pt bladder scanned and 42ml, pt only had 50ml void since I&O started. Dr. Peters notified. IV bolus was given. Pt lips are chapped and urine is very dark and concentrated.

## 2024-05-31 NOTE — PLAN OF CARE
Pt had good pain relief with IV fentanyl.  Pt having increased urge to push.  Dr Peters in the room for SVE, anterior rim of cervix noted.  Significant other present and supportive.  BP stable.  Will continue to monitor and anticipate .

## 2024-05-31 NOTE — PROGRESS NOTES
Dr. Peters called the unit for update. Pt is requesting IV pain management at this time, pt dilated to 6cm and rating pain 10/10. MD stated pt may have 100mcg of Fentanyl via IV as ordered. Pt placed on cont. pulse ox.

## 2024-05-31 NOTE — PROGRESS NOTES
Progressed to 9 cm. Very strong, but ineffective involuntary pushing efforts. Unable to breath through. Contractions of variable strength, some shorter. FHT category 1. IUPC placed without difficulty confirming clinical impression (cervix stretched tight with some contractions, not with others). Discussed options. She elected to go with an intrathecal.    Is still 9 cm, now with some caput. Baby DANY. Monitoring FHT with mobile monitor, IUPC on separate strip. First 10 minutes is at 200 mU's.

## 2024-05-31 NOTE — ANESTHESIA PREPROCEDURE EVALUATION
Anesthesia Pre-Procedure Evaluation    Patient: Shirley Daniels   MRN: [id number] : 2000        Procedure :           Past Medical History:   Diagnosis Date    Anemia due to blood loss, acute, due to Vaginal Delivery, NOT complicated--2019    Gestational diabetes mellitus (GDM) in third trimester 2019    Hypertension in pregnancy, preeclampsia, severe, postpartum condition--ECHO normal, EKG abnormal P waves, CT, Labetolol-Hydralazine--5/15/2019 2019    Preeclampsia, severe, third trimester--Magnesium---2019    Ruptured ovarian cyst 2/10/2014    Ruptured ovarian cyst with hemoperitoneum and pelvic pain       Past Surgical History:   Procedure Laterality Date    C ETONOGESTREL IMPLANT SYSTEM  2020         HC REMOVE TONSILS/ADENOIDS,<13 Y/O      SURGICAL PATHOLOGY EXAM      removal of papiloma      No Known Allergies   Social History     Tobacco Use    Smoking status: Never    Smokeless tobacco: Never   Substance Use Topics    Alcohol use: No     Comment: last use month ago      Wt Readings from Last 1 Encounters:   24 66.9 kg (147 lb 6.4 oz)        Anesthesia Evaluation   Pt has had prior anesthetic. Type: General and OB Labor Epidural.        ROS/MED HX  ENT/Pulmonary: Comment: Tobacco use      Neurologic:  - neg neurologic ROS     Cardiovascular: Comment: HTN during hospitalization    (+)  - - PIH  -  - -                                      METS/Exercise Tolerance:     Hematologic:  - neg hematologic  ROS     Musculoskeletal:       GI/Hepatic:  - neg GI/hepatic ROS     Renal/Genitourinary:       Endo:  - neg endo ROS     Psychiatric/Substance Use:     (+) psychiatric history depression and anxiety   Recreational drug usage: Meth and Cannabis.    Infectious Disease:       Malignancy:       Other:            Physical Exam    Airway        Mallampati: II   TM distance: > 3 FB   Neck ROM: full   Mouth opening: > 3 cm    Respiratory Devices and Support          Dental     Comment: Poor dentition throughout        Cardiovascular   cardiovascular exam normal          Pulmonary   pulmonary exam normal                OUTSIDE LABS:  CBC:   Lab Results   Component Value Date    WBC 8.6 05/31/2024    WBC 12.3 (H) 05/09/2020    HGB 12.3 05/31/2024    HGB 14.5 05/09/2020    HCT 35.9 05/31/2024    HCT 41.3 05/09/2020     05/31/2024     05/09/2020     BMP:   Lab Results   Component Value Date     05/31/2024     05/17/2019    POTASSIUM 3.5 05/31/2024    POTASSIUM 4.3 05/17/2019    CHLORIDE 107 05/31/2024    CHLORIDE 112 (H) 05/17/2019    CO2 19 (L) 05/31/2024    CO2 22 05/17/2019    BUN 7.5 05/31/2024    BUN 9 05/16/2019    CR 0.70 05/31/2024    CR 0.62 05/16/2019     (H) 05/31/2024    GLC 82 05/16/2019     COAGS:   Lab Results   Component Value Date    INR 1.27 (H) 02/09/2014     POC:   Lab Results   Component Value Date    BGM 86 05/12/2019    HCG Negative 08/19/2018    HCGS Negative 01/16/2016     HEPATIC:   Lab Results   Component Value Date    ALBUMIN 3.1 (L) 05/31/2024    PROTTOTAL 5.9 (L) 05/31/2024    ALT 20 05/31/2024    AST 27 05/31/2024    ALKPHOS 281 (H) 05/31/2024    BILITOTAL 0.3 05/31/2024     OTHER:   Lab Results   Component Value Date    LACT 1.8 08/19/2018    A1C 5.2 05/31/2024    GRETA 8.8 05/31/2024    MAG 1.8 05/16/2019    CRP <2.9 12/18/2016    SED 8 01/16/2016       Anesthesia Plan    ASA Status:  2, emergent       Anesthesia Type: Spinal.              Consents    Anesthesia Plan(s) and associated risks, benefits, and realistic alternatives discussed. Questions answered and patient/representative(s) expressed understanding.     - Discussed:     - Discussed with:  Patient            Postoperative Care            Comments:    Other Comments: Discussed risks and benefits of spinal anesthetic with patient including itching, sore back, infection, hematoma, spinal headache, CV complications, nerve damage, inability to place,  conversion to general anesthesia, loss of airway, and death. Pt wishes to proceed.         neg OB ROS.      XOCHITL MAS CRNA    I have reviewed the pertinent notes and labs in the chart from the past 30 days and (re)examined the patient.  Any updates or changes from those notes are reflected in this note.          # Hypoalbuminemia: Lowest albumin = 3.1 g/dL at 5/31/2024  8:26 AM, will monitor as appropriate    # Drug Induced Platelet Defect: home medication list includes an antiplatelet medication

## 2024-05-31 NOTE — PROGRESS NOTES
Tolerating misoprostol. BP's running 140's/100's range. Normal platelets. Protein.Cr ratio <0.3. LFT's pending.     PIH without severe features. At this point do not feel that we need to start magnesium. Did enter order set in case she develops severe features so that we are ready to intervene if needed.

## 2024-05-31 NOTE — PLAN OF CARE
Pt with a hx of drug use during pregnancy.  UDS positive for THC on admission today.  Online CPS report complete and emailed to [email or web address], a copy was placed on the chart and the writer spoke with Leta at CPS and she requested that we call once pt delivers.  Plan at delivery is to put pt on a  hold.

## 2024-05-31 NOTE — PLAN OF CARE
Goal Outcome Evaluation:      Plan of Care Reviewed With: patient, significant other    Overall Patient Progress: no changeOverall Patient Progress: no change     Induction of Labor Admit Note  Shirley Daniels  MRN: [id number]  Gestational Age: 40w1d      Shirley Daniels presents for induction of labor for drug use.  Patient denies contractions, bleeding or ROM.    Past Medical History:   Diagnosis Date    Anemia due to blood loss, acute, due to Vaginal Delivery, NOT complicated--2019    Gestational diabetes mellitus (GDM) in third trimester 2019    Hypertension in pregnancy, preeclampsia, severe, postpartum condition--ECHO normal, EKG abnormal P waves, CT, Labetolol-Hydralazine--5/15/2019 2019    Preeclampsia, severe, third trimester--Magnesium---2019    Ruptured ovarian cyst 2/10/2014    Ruptured ovarian cyst with hemoperitoneum and pelvic pain      OB Induction Plan: Completed and available in epic chart.    Dr Peters on the Unit prior to pt's arrival. Dr Peters in the room and did SVE, cervix closed.      Patient is alert and oriented X 3, denies any pain. pain. Patient oriented to room, unit, hourly rounding, and plan of care. Call light within reach.  Explained admission packet with patient bill of rights brochure. Will continue to monitor and document as needed.     Inpatient nursing criteria listed below was met:    Health care directives status obtained and documented: Yes  Patient identifies a surrogate decision maker:  yes   If yes, who:Derrell Contact Information:see flowsheet  Clergy visit ordered if patient requests:  declines  Skin issues/needs documented:N/A  Isolation needs addressed, if appropriate: N/A  Fall Prevention (Med and High risk): Care plan updated, Education given and documented and signage used: N/A  Care Plan initiated: Yes  Education Documented (Reminder to educate patient if MRSA is present on admission): Yes  Education Assessment  documented:Yes  Patient has discharge needs unknown.  CPS is involved in pt's case.   Plan:   Cytotec solution per orders.

## 2024-05-31 NOTE — PLAN OF CARE
Dr Peters paged to update of elevated BPs at times and that first dose of cytotec 40 was given, contractions every 3 min.  She stated she would place the hypertensive orders in case they are needed.  Will continue to monitor pt and provide cares as needed. Pt is aware to call with any issues or concerns.

## 2024-05-31 NOTE — PROGRESS NOTES
Plan of care is to stop administration of Cytotec at this time and allow pt to progress in labor. Cervix changed from closed to 3/50%/0. MD stated to recheck cervix as needed and if pt requests pain medication. MD stated pt may have an epidural when requested.

## 2024-06-01 PROBLEM — Z34.90 ENCOUNTER FOR INDUCTION OF LABOR: Status: RESOLVED | Noted: 2024-05-31 | Resolved: 2024-06-01

## 2024-06-01 PROBLEM — O13.9 PIH (PREGNANCY INDUCED HYPERTENSION): Status: ACTIVE | Noted: 2024-06-01

## 2024-06-01 PROBLEM — O47.03 THREATENED PREMATURE LABOR IN THIRD TRIMESTER: Status: RESOLVED | Noted: 2024-04-25 | Resolved: 2024-06-01

## 2024-06-01 PROBLEM — Z36.89 ENCOUNTER FOR TRIAGE IN PREGNANT PATIENT: Status: RESOLVED | Noted: 2024-04-25 | Resolved: 2024-06-01

## 2024-06-01 LAB
HBV SURFACE AG SERPL QL IA: NONREACTIVE
HCV AB SERPL QL IA: NONREACTIVE
HIV 1+2 AB+HIV1 P24 AG SERPL QL IA: NONREACTIVE
T PALLIDUM AB SER QL: NONREACTIVE

## 2024-06-01 PROCEDURE — 250N000013 HC RX MED GY IP 250 OP 250 PS 637: Performed by: FAMILY MEDICINE

## 2024-06-01 PROCEDURE — 120N000001 HC R&B MED SURG/OB

## 2024-06-01 RX ADMIN — IBUPROFEN 800 MG: 800 TABLET ORAL at 08:06

## 2024-06-01 RX ADMIN — DOCUSATE SODIUM 100 MG: 100 CAPSULE, LIQUID FILLED ORAL at 08:06

## 2024-06-01 RX ADMIN — PRENATAL VITAMINS-IRON FUMARATE 27 MG IRON-FOLIC ACID 0.8 MG TABLET 1 TABLET: at 08:06

## 2024-06-01 RX ADMIN — ACETAMINOPHEN 650 MG: 325 TABLET, FILM COATED ORAL at 11:30

## 2024-06-01 ASSESSMENT — ACTIVITIES OF DAILY LIVING (ADL)
ADLS_ACUITY_SCORE: 24
ADLS_ACUITY_SCORE: 21
ADLS_ACUITY_SCORE: 23
ADLS_ACUITY_SCORE: 21
ADLS_ACUITY_SCORE: 21
ADLS_ACUITY_SCORE: 23
ADLS_ACUITY_SCORE: 23
ADLS_ACUITY_SCORE: 24
ADLS_ACUITY_SCORE: 23
ADLS_ACUITY_SCORE: 24
ADLS_ACUITY_SCORE: 23
ADLS_ACUITY_SCORE: 24
ADLS_ACUITY_SCORE: 21
ADLS_ACUITY_SCORE: 21
ADLS_ACUITY_SCORE: 23
ADLS_ACUITY_SCORE: 23
ADLS_ACUITY_SCORE: 24
ADLS_ACUITY_SCORE: 21
ADLS_ACUITY_SCORE: 21
ADLS_ACUITY_SCORE: 23
ADLS_ACUITY_SCORE: 24

## 2024-06-01 NOTE — PROGRESS NOTES
1658 IUPC was placed by Dr. Peters due to pt unable to progress from 9cm. Dr. Peters requested to keep IUPC in place but to put ozzie back in place and bring in a second monitor to watch contraction strength via IUPC. Dr. Peters has remained on unit and has been monitoring pt's contractions via IUPC and stated pt's MVUs remains around 200 and stated to not start pitocin at this time. MD is aware of FHT and cat 2 tracing. No other order at this time. Will report to oncoming shift. Pt is resting and sleeping after intrathecal was placed. Prior to intrathecal pt was involuntarily pushing and uncontrollably thrashing in pain.

## 2024-06-01 NOTE — L&D DELIVERY NOTE
presented at 40 1/7 weeks for labor induction for ongoing methamphetamine and marijuana abuse. On admission and during labor she had elevated BP's in the 140-90 range. Cervical ripening was undertaken. She went into a spontaneous labor. At 9 cm she had involuntary pushing urges. The pain became overwhelming. She received an intrathecal. She did require low dose oxytocin augmentation. She delivered a 7# 5 oz female infant. Mother and father are bonding with the baby. Police hold was placed and Child Protection was notified.     Delivery Summary    Shirley Daniels MRN# [id number]   Age: 23 year old YOB: 2000        Frances, Female-Shirley [[id number]]      Labor Event Times      Latent labor onset date/time: 2024 1300    Active labor onset date: 24 Onset time:  2:00 PM   Dilation complete date: 24 Complete time:  8:23 PM   Start pushing date/time: 2024 1945          Labor Length      1st Stage (hrs): 6 (min): 23   2nd Stage (hrs): 0 (min): 1   3rd Stage (hrs): 0 (min): 4          Labor Events     labor?: No   steroids: None  Labor Type: Induction/Cervical ripening  Predominate monitoring during 1st stage: continuous electronic fetal monitoring     Antibiotics received during labor?: No       Rupture date/time: 24 1634   Rupture type: Artificial Rupture of Membranes  Fluid color: Meconium  Fluid odor: Normal     Induction: Misoprostol  Induction date/time:      Cervical ripening date/time: 24 0857    Indications for induction: Other Pregnant Patient Indications (Comment to specify)     Augmentation: Oxytocin  Indications for augmentation: Ineffective Contraction Pattern       Delivery/Placenta Date and Time      Delivery Date: 24 Delivery Time:  8:24 PM   Placenta Date/Time: 2024  8:28 PM  Oxytocin given at the time of delivery: after delivery of baby  Delivering clinician: Jorge L Peters MD   Other personnel present at delivery:  Provider  "Role   Shonda Lazar RN Registered Nurse   Madhuri Damon RN Registered Nurse   Kecia Campa, RT Respiratory Therapist             Vaginal Counts       Initial count performed by 2 team members:  Two Team Members   Leta BULL RN         Needles Suture Needles Sponges (RETIRED) Instruments   Initial counts 2 0 15    Added to count       Relief counts       Final counts 2 0 15            Placed during labor Accounted for at the end of labor   FSE NA NA   IUPC Yes Yes   Cervidil NA NA           Relief count performed by 2 team members:  Two Team Members   MARYSE BULL RN         Final count performed by 2 team members:  Two Team Members   MARYSE Merchant RN      Final count correct?: Yes       Apgars    Living status: Living   1 Minute 5 Minute 10 Minute 15 Minute 20 Minute   Skin color: 1  1       Heart rate: 2  2       Reflex irritability: 2  2       Muscle tone: 2  2       Respiratory effort: 1  2       Total: 8  9       Apgars assigned by: MARYSE LAZAR RN       Cord      Vessels: 3 Vessels    Cord Complications: Nuchal   Nuchal Intervention: delivered through         Nuchal cord description: loose nuchal cord         Cord Blood Disposition: Lab    Gases Sent?: No    Delayed cord clamping?: Yes    Cord Clamping Delay (seconds):  seconds    Stem cell collection?: No           [place] Resuscitation    Methods: None  [place] Care at Delivery:  of viable Female. Nursery RN Shonda & Kecia, RT present. Infant with spontaneous lusty cry, to mother's abdomen, dried and stimulated. Hat applied, pinking up well. Mother and baby in stable condition. Baby skin-to-skin with mom. ID bands placed on infant, mom and dad.          Measurements      Weight: 7 lb 5.3 oz Length: 1' 8.5\"     Head circumference: 34.3 cm Chest circumference: 33 cm   Abdominal girth: 30.5 cm       Skin to Skin and Feeding Plan      Skin to skin initiation date/time: 1841    Skin to skin with: " Mother  Skin to skin end date/time: 1/5/1841           Labor Events and Shoulder Dystocia    Fetal Tracing Prior to Delivery: Category 1  Shoulder dystocia present?: Neg       Delivery (Maternal) (Provider to Complete) ([id number])           Blood Loss  Mother: Shirley Daniels #[id number]     Start of Mother's Information      Delivery Blood Loss  05/31/24 1400 - 05/31/24 2252      Delivery QBL (mL) Hospital Encounter 245 mL    Total  245 mL               End of Mother's Information  Mother: Shirley Daniels #[id number]                Delivery - Provider to Complete ([id number])    Delivering clinician: Jorge L Peters MD  Delivery Type (Choose the 1 that will go to the Birth History): Vaginal, Spontaneous                         Other personnel:  Provider Role   Shonda Nagy, RN Registered Nurse   Madhuri Damon, RN Registered Nurse   Kecia Campa, RT Respiratory Therapist                    Placenta    Date/Time: 5/31/2024  8:28 PM  Removal: Spontaneous  Disposition: Hospital disposal             Anesthesia    Method: INTRAVENOUS , Intrathecal                    Presentation and Position    Presentation: Vertex    Position: Left Occiput Anterior                     Jorge L Peters MD

## 2024-06-01 NOTE — PLAN OF CARE
BP's remain 140's/80's no other s/s at this time. Pt up to void several times and has otherwise been sleeping. Significant other resting on cot at bedside. Infant on hold, but had remained in room with parents since birth with 1:1 observation.  At 0130 Parents requested infant be taken to nursery and infant has remained 1:1 with nurse throughout the remainder of this shift.

## 2024-06-01 NOTE — PLAN OF CARE
Data: Vital signs within normal limits. Postpartum checks within normal limits - see flow record. Patient eating and drinking normally. Patient able to empty bladder independently and is up ambulating. No apparent signs of infection.  Laceration healing well. Patient performing self cares.  Action: Patient medicated during the shift for cramping. See MAR. MD on unit and notified of blood pressures.   Response: Support person present. Infant has been in  nursery with staff since 58. RN asked mother if she would like  in the room this morning, mother declined. Education provided. See flow record.

## 2024-06-01 NOTE — PLAN OF CARE
BP assessed, but pt noted to be shaky and unable to hold arm still to get accurate reading. Pt up to bathroom SBA and voided large amount. Pt tucked back in bed with warm blankets. Pt denies blurred vision, headache or epigastric pain. BP re-assessed and 140's/80's. Will continue to monitor closely.

## 2024-06-01 NOTE — PLAN OF CARE
Face to face report given with opportunity to observe patient.    Report given to Ada Damon RN   6/1/2024  7:02 AM

## 2024-06-01 NOTE — PROGRESS NOTES
Face to face report given with opportunity to observe patient.    Report given to JESSICA Rodriguez RN   5/31/2024  7:32 PM

## 2024-06-01 NOTE — PROGRESS NOTES
[facility] Obstetrics Post-Partum Progress Note          Assessment and Plan:    Assessment:   Post-partum day #1  Normal spontaneous vaginal delivery  L&D complications: Pregnancy induced hypertension  Chronic marijuana use   Methamphetamine use during pregnancy  Doing well.      Plan:   Ambulation encouraged  Monitor blood pressures           Interval History:   Doing well.  Pain is well-controlled.  No fevers.  No history of foul-smelling vaginal discharge.  Good appetite.  Denies chest pain, shortness of breath, nausea or vomiting.  Vaginal bleeding is similar to a heavy menstrual flow.  Ambulatory.            Significant Problems:    PIH without severe features-stable BP's          Review of Systems:    Craving a cigarette. Perineal discomfort          Medications:   Prn ibuprofen.  Declines nicotine replacement              Physical Exam:   Temp: 98  F (36.7  C) Temp src: Oral BP: 136/89 Pulse: 54   Resp: 16 SpO2: 96 % O2 Device: None (Room air)    Patient Vitals for the past 12 hrs:   BP Temp Temp src Pulse Resp SpO2   06/01/24 0811 136/89 98  F (36.7  C) Oral 54 16 96 %   06/01/24 0421 140/87 -- -- -- -- --   06/01/24 0300 147/82 -- -- -- -- --   05/31/24 2354 148/88 -- -- -- -- 96 %   05/31/24 2230 157/82 -- -- -- -- --   05/31/24 2215 140/82 -- -- -- -- --   05/31/24 2200 142/92 -- -- -- -- --   05/31/24 2145 149/91 -- -- -- -- --   05/31/24 2130 141/88 -- -- -- -- --   05/31/24 2115 140/89 -- -- -- -- --   05/31/24 2100 135/90 -- -- -- -- --   05/31/24 2047 123/93 -- -- -- -- --   05/31/24 2040 -- -- -- -- -- 97 %   05/31/24 2035 -- -- -- -- -- 97 %   05/31/24 2030 -- -- -- -- -- 98 %   05/31/24 2025 -- -- -- -- -- 97 %   05/31/24 2020 -- -- -- -- -- 95 %     Uterine fundus is firm, non-tender and at the level of the umbilicus  Resigned to  involvement.          Data:   Normal platelets amd transaminases.  No imaging studies have been ordered    Jorge L Peters MD

## 2024-06-01 NOTE — ANESTHESIA POSTPROCEDURE EVALUATION
Patient: Shirley Daniels    Procedure: * No procedures listed *       Anesthesia Type:  Spinal    Note:  Disposition: Inpatient   Postop Pain Control: Uneventful            Sign Out: Well controlled pain   PONV: No   Neuro/Psych: Uneventful            Sign Out: Acceptable/Baseline neuro status   Airway/Respiratory: Uneventful            Sign Out: Acceptable/Baseline resp. status   CV/Hemodynamics: Uneventful            Sign Out: Acceptable CV status; No obvious hypovolemia; No obvious fluid overload   Other NRE: NONE   DID A NON-ROUTINE EVENT OCCUR? No           Last vitals:  Vitals:    05/31/24 1852 05/31/24 1856 05/31/24 1945   BP:   141/83   Pulse:      Resp:   18   Temp:   98  F (36.7  C)   SpO2: 94% 95% 97%       Electronically Signed By: XOCHITL MAS CRNA  May 31, 2024  8:53 PM

## 2024-06-01 NOTE — DISCHARGE INSTRUCTIONS
Follow up with Dr. Gomez Friday June 28th at 3:20 pm at [facility]    Warning Signs after Having a Baby    Keep this paper on your fridge or somewhere else where you can see it.    Call your provider if you have any of these symptoms up to 12 weeks after having your baby.    Thoughts of hurting yourself or your baby  Pain in your chest or trouble breathing  Severe headache not helped by pain medicine  Eyesight concerns (blurry vision, seeing spots or flashes of light, other changes to eyesight)  Fainting, shaking or other signs of a seizure    Call 9-1-1 if you feel that it is an emergency.     The symptoms below can happen to anyone after giving birth. They can be very serious. Call your provider if you have any of these warning signs.    My provider s phone number: _______________________    Losing too much blood (hemorrhage)    Call your provider if you soak through a pad in less than an hour or pass blood clots bigger than a golf ball. These may be signs that you are bleeding too much.    Blood clots in the legs or lungs    After you give birth, your body naturally clots its blood to help prevent blood loss. Sometimes this increased clotting can happen in other areas of the body, like the legs or lungs. This can block your blood flow and be very dangerous.     Call your provider if you:  Have a red, swollen spot on the back of your leg that is warm or painful when you touch it.   Are coughing up blood.     Infection    Call your provider if you have any of these symptoms:  Fever of 100.4 F (38 C) or higher.  Pain or redness around your stitches if you had an incision.   Any yellow, white, or green fluid coming from places where you had stitches or surgery.    Mood Problems (postpartum depression)    Many people feel sad or have mood changes after having a baby. But for some people, these mood swings are worse.     Call your provider right away if you feel so anxious or nervous that you can't care for  yourself or your baby.    Preeclampsia (high blood pressure)    Even if you didn't have high blood pressure when you were pregnant, you are at risk for the high blood pressure disease called preeclampsia. This risk can last up to 12 weeks after giving birth.     Call your provider if you have:   Pain on your right side under your rib cage  Sudden swelling in the hands and face    Remember: You know your body. If something doesn't feel right, get medical help.     For informational purposes only. Not to replace the advice of your health care provider. Copyright 2020 [employer] [facility]. All rights reserved. Clinically reviewed by Taylor Jade, RNC-OB, MSN. [employer] [id number] - Rev 02/23.

## 2024-06-01 NOTE — ANESTHESIA CARE TRANSFER NOTE
Patient: Shirley Daniels    Procedure: * No procedures listed *       Diagnosis: * No pre-op diagnosis entered *  Diagnosis Additional Information: No value filed.    Anesthesia Type:   Spinal     Note:    Oropharynx: oropharynx clear of all foreign objects and spontaneously breathing  Level of Consciousness: awake  Oxygen Supplementation: room air    Independent Airway: airway patency satisfactory and stable  Dentition: dentition unchanged  Vital Signs Stable: post-procedure vital signs reviewed and stable    Patient transferred to: Labor and Delivery    Handoff Report: Identifed the Patient, Identified the Reponsible Provider, Reviewed the pertinent medical history, Discussed the surgical course, Reviewed Intra-OP anesthesia mangement and issues during anesthesia, Set expectations for post-procedure period and Allowed opportunity for questions and acknowledgement of understanding      Vitals:  Vitals Value Taken Time   /93 05/31/24 2047   Temp     Pulse     Resp     SpO2 97 % 05/31/24 2042   Vitals shown include unfiled device data.    Electronically Signed By: XOCHITL MAS CRNA  May 31, 2024  8:53 PM

## 2024-06-01 NOTE — PLAN OF CARE
Assessments completed as charted. B/P: 148/88, T: 98, P: 73, R: 18. Rates pain: 3/10 . Voiding without difficulty. Fundus: Midline, firm U/1. Lochia: Light. Activity: normal activity. Infant feeding: Formula. Infant on hold and has been under 1:1 observation by staff.     Postpartum care education provided, see Patient Education activity. Patient denies needs. Will monitor.  Madhuri Damon RN

## 2024-06-01 NOTE — PROGRESS NOTES
Viable baby girl delivered via  at . Infant dried and stimulated and placed skin to skin with mom.  notified of delivery and hold in place.

## 2024-06-02 PROCEDURE — 250N000013 HC RX MED GY IP 250 OP 250 PS 637: Performed by: FAMILY MEDICINE

## 2024-06-02 PROCEDURE — 120N000001 HC R&B MED SURG/OB

## 2024-06-02 RX ADMIN — DOCUSATE SODIUM 100 MG: 100 CAPSULE, LIQUID FILLED ORAL at 10:57

## 2024-06-02 RX ADMIN — PRENATAL VITAMINS-IRON FUMARATE 27 MG IRON-FOLIC ACID 0.8 MG TABLET 1 TABLET: at 10:57

## 2024-06-02 RX ADMIN — IBUPROFEN 800 MG: 800 TABLET ORAL at 07:59

## 2024-06-02 RX ADMIN — ACETAMINOPHEN 650 MG: 325 TABLET, FILM COATED ORAL at 10:57

## 2024-06-02 ASSESSMENT — ACTIVITIES OF DAILY LIVING (ADL)
ADLS_ACUITY_SCORE: 21
ADLS_ACUITY_SCORE: 21
ADLS_ACUITY_SCORE: 20
ADLS_ACUITY_SCORE: 21
ADLS_ACUITY_SCORE: 21
ADLS_ACUITY_SCORE: 20
ADLS_ACUITY_SCORE: 21
ADLS_ACUITY_SCORE: 20
ADLS_ACUITY_SCORE: 21
ADLS_ACUITY_SCORE: 20
ADLS_ACUITY_SCORE: 21
ADLS_ACUITY_SCORE: 21

## 2024-06-02 NOTE — PLAN OF CARE
Assessments completed as charted. B/P: 130/83, T: 98.3, P: 63, R: 16. Denies pain. Pt using dermoplast spray, tucks pads and soaking in tub for pain control. Pt voiding without difficulty. Uterus midline, U/1. Bleeding light, no clots. Pt up in room independently. Infant is on hold r/t drug use. Infant out of patient's room and 1:1 with attendant per patient. Patient sleeping throughout shift. Infant formula feeding.     Postpartum care education provided, see Patient Education activity. Patient denies needs. Will monitor.  Madhuri Damon RN

## 2024-06-02 NOTE — PLAN OF CARE
Data: Vital signs within normal limits. Postpartum checks within normal limits - see flow record. Patient eating and drinking normally. Patient able to empty bladder independently and is up ambulating. No apparent signs of infection.  Laceration  healing well. Patient performing self cares and is able to care for infant.  Action: Patient medicated during the shift for cramping. See MAR. Patient reassessed within 1 hour after each medication and pain was improved - patient stated she was comfortable. Patient education done. See flow record.  Response: Positive attachment behaviors observed with infant. Support person present.   Plan: Anticipate discharge on 06/03.

## 2024-06-02 NOTE — PLAN OF CARE
Face to face report given with opportunity to observe patient.    Report given to Jennifer Kenney RN   6/1/2024  7:14 PM

## 2024-06-02 NOTE — PLAN OF CARE
Data: Vital signs within normal limits. Postpartum checks within normal limits - see flow record. Patient eating and drinking normally. Patient able to empty bladder independently and is up ambulating. No apparent signs of infection.  Laceration  healing well. Patient performing self cares and is able to care for infant. Infant has been with staff in  nursery per maternal request since .   Action: Patient medicated during the shift for cramping. See MAR. Patient education done. See flow record.  Response: Positive attachment behaviors observed with infant. Support person present.

## 2024-06-02 NOTE — PROGRESS NOTES
"[facility] Obstetrics Post-Partum Progress Note          Assessment and Plan:    Assessment:   Post-partum day #1  Normal spontaneous vaginal delivery  L&D complications: Pregnancy induced hypertension-improved  Chronic marijuana use   Methamphetamine use during pregnancy  Doing well.      Plan:   Ambulation encouraged  Monitor blood pressures again today.           Interval History:   Doing well.  Pain is well-controlled.  No fevers.  No history of foul-smelling vaginal discharge.  Good appetite.  Denies chest pain, shortness of breath, nausea or vomiting.  Vaginal bleeding is similar to a heavy menstrual flow.  Ambulatory.            Significant Problems:    PIH without severe features-BP's 130/70\"s this morning          Review of Systems:   Perineal discomfort less.          Medications:   Prn ibuprofen.  Declines nicotine replacement              Physical Exam:   Temp: 98.1  F (36.7  C) Temp src: Oral BP: 132/74 Pulse: 59   Resp: 16 SpO2: 97 % O2 Device: None (Room air)    Patient Vitals for the past 12 hrs:   BP Temp Temp src Pulse Resp SpO2   06/02/24 0754 132/74 98.1  F (36.7  C) Oral 59 16 97 %   06/01/24 2300 130/83 98.3  F (36.8  C) Oral 63 16 97 %     Uterine fundus is firm, non-tender and at the level of the umbilicus minus three  Very tearful thinking about baby not going home with her.          Data:   Normal platelets and transaminases.  No imaging studies have been ordered    Jorge L Peters MD     "

## 2024-06-02 NOTE — PLAN OF CARE
Face to face report given with opportunity to observe patient.    Report given to Ada Damon RN   6/2/2024  6:53 AM

## 2024-06-03 VITALS
WEIGHT: 147.4 LBS | HEART RATE: 72 BPM | OXYGEN SATURATION: 98 % | BODY MASS INDEX: 26.12 KG/M2 | SYSTOLIC BLOOD PRESSURE: 147 MMHG | RESPIRATION RATE: 16 BRPM | TEMPERATURE: 98.7 F | DIASTOLIC BLOOD PRESSURE: 88 MMHG | HEIGHT: 63 IN

## 2024-06-03 PROCEDURE — 250N000011 HC RX IP 250 OP 636: Performed by: FAMILY MEDICINE

## 2024-06-03 PROCEDURE — 250N000013 HC RX MED GY IP 250 OP 250 PS 637: Performed by: FAMILY MEDICINE

## 2024-06-03 PROCEDURE — 250N000009 HC RX 250: Performed by: FAMILY MEDICINE

## 2024-06-03 RX ORDER — LIDOCAINE HYDROCHLORIDE 10 MG/ML
5 INJECTION, SOLUTION EPIDURAL; INFILTRATION; INTRACAUDAL; PERINEURAL ONCE
Status: COMPLETED | OUTPATIENT
Start: 2024-06-03 | End: 2024-06-03

## 2024-06-03 RX ADMIN — IBUPROFEN 800 MG: 800 TABLET ORAL at 07:58

## 2024-06-03 RX ADMIN — ETONOGESTREL 68 MG: 68 IMPLANT SUBCUTANEOUS at 09:18

## 2024-06-03 RX ADMIN — LIDOCAINE HYDROCHLORIDE 4 ML: 10 INJECTION, SOLUTION EPIDURAL; INFILTRATION; INTRACAUDAL; PERINEURAL at 09:18

## 2024-06-03 RX ADMIN — PRENATAL VITAMINS-IRON FUMARATE 27 MG IRON-FOLIC ACID 0.8 MG TABLET 1 TABLET: at 09:11

## 2024-06-03 RX ADMIN — DOCUSATE SODIUM 100 MG: 100 CAPSULE, LIQUID FILLED ORAL at 09:11

## 2024-06-03 ASSESSMENT — ACTIVITIES OF DAILY LIVING (ADL)
ADLS_ACUITY_SCORE: 20

## 2024-06-03 NOTE — PLAN OF CARE
Data: Vital signs within normal limits. Postpartum checks within normal limits - see flow record. Patient eating and drinking normally. Patient able to empty bladder independently and is up ambulating. No apparent signs of infection.  Laceration healing well. Patient performing self cares and is able to care for infant.  Action: Patient medicated during the shift for pain. See MAR.  Patient education done. See flow record.  Response: Positive attachment behaviors observed with infant. Support person present.   Plan: Anticipate discharge today.

## 2024-06-03 NOTE — PLAN OF CARE
Face to face report given with opportunity to observe patient.    Report given to Ada Damon RN   6/3/2024  7:16 AM

## 2024-06-03 NOTE — PLAN OF CARE
Pt was inconsolable, crying and wanting to leave unit. Writer was able to calm patient down to discuss her plans for after discharge. Shirley verbalized she would remain sober, and that she had no thoughts or plans of harming herself. Shirley was escorted out with RN and security. Police spoke with Shirley and Emerson at their car about their plan to drive home and work with the social workers. Shirley and Emerson were calm at this moment and agreed to drive home and verbalized their compliance with the county and social workers to the . See note in infants chart for further detail on discharge.     Discharge instructions had been gone through with Shirley and Emerson earlier in the morning, pt verbalized understanding.  Listed belongings remained with patient.  Follow up appointment made:  Yes  Home and hospital aquired medications returned to patient: N/A  Patient reports pain was well managed at discharge: Yes

## 2024-06-03 NOTE — PLAN OF CARE
Face to face report given with opportunity to observe patient.    Report given to Jennifer Kenney RN   6/2/2024  7:12 PM

## 2024-06-03 NOTE — PLAN OF CARE
Assessments completed as charted. B/P: 129/78, T: 98.2, P: 58, R: 18. Rates pain: 0/10. Voiding without difficulty. Fundus: Midline, U/1. Lochia: Light. Activity: normal activity. Infant feeding: Formula. Patient bonding, cuddling and feeding baby throughout shift. Pt has kept infant with her in room so far this shift with 1:1 attendant present d/t police hold on infant.   Postpartum care education provided, see Patient Education activity. Patient denies needs. Will monitor.  Madhuri Damon RN

## 2024-06-03 NOTE — PROCEDURES
Patient requested Nexplanon for contraception. Pros, cons and alternatives were discussed. She elected to proceed. The site of her previous insertion was identified. After injection of 3 ml lidocaine, a Nexplanon was inserted without difficulty. Both the patient and I could palpate it in position in the left arm. Aftercare was discussed. She will return if problems, otherwise replace in three years.

## 2024-06-04 NOTE — DISCHARGE SUMMARY
[facility] Obstetrics Post-Partum Discharge Summary          Assessment and Plan:    Assessment:   Post-partum day #3  Normal spontaneous vaginal delivery  L&D complications: Pregnancy induced hypertension-resolved    methamphetamine abuse  Marijuana dependence  Tobacco abuse  Dental caries  Major depressive disorder, in remission  Desire for Nexplanon placement.      Plan:   Discharge later today after  comes.         Follow up in one month at [facility], earlier if problems.       Interval History:   Tearful this morning. Very nervous about  coming and that she won't be able to take the baby home with her. BP's good yesterday. Higher this am in anticipation.          Significant Problems:    Active Problems:    PIH (pregnancy induced hypertension) without severe features           Review of Systems:    Feeling fairly good.           Medications:        Review of your medicines        STOP taking      aspirin 81 MG chewable tablet  Commonly known as: ASA        PRENATAL VITAMIN PO              Nexplanon  placed 6/3/24          Physical Exam:   All vitals stable  Patient Vitals for the past 24 hrs:   BP Temp Temp src Pulse Resp SpO2   24 0807 147/88 98.7  F (37.1  C) Oral -- -- --   24 0801 -- -- -- 72 16 98 %   24 2305 129/78 98.2  F (36.8  C) Oral -- 18 95 %     Uterine fundus is firm, non-tender and at the level of the umbilicus          Data:     Lab Results   Component Value Date    WBC 8.6 2024    HGB 12.3 2024    HCT 35.9 2024    MCV 87 2024     2024     Drug screen positive for THC  No imaging studies have been ordered    Jorge L Peters MD

## 2024-06-05 LAB
CANNABINOIDS UR CFM-MCNC: 172 NG/ML
CARBOXYTHC/CREAT UR: 54 NG/MG CREAT

## 2024-07-06 ENCOUNTER — HEALTH MAINTENANCE LETTER (OUTPATIENT)
Age: 24
End: 2024-07-06

## 2025-07-13 ENCOUNTER — HEALTH MAINTENANCE LETTER (OUTPATIENT)
Age: 25
End: 2025-07-13